# Patient Record
Sex: FEMALE | Race: WHITE | Employment: FULL TIME | URBAN - NONMETROPOLITAN AREA
[De-identification: names, ages, dates, MRNs, and addresses within clinical notes are randomized per-mention and may not be internally consistent; named-entity substitution may affect disease eponyms.]

---

## 2019-04-08 ENCOUNTER — HOSPITAL ENCOUNTER (OUTPATIENT)
Age: 34
Setting detail: SPECIMEN
Discharge: HOME | End: 2019-04-08
Payer: COMMERCIAL

## 2019-04-08 ENCOUNTER — OFFICE VISIT (OUTPATIENT)
Dept: OBGYN | Age: 34
End: 2019-04-08
Payer: COMMERCIAL

## 2019-04-08 VITALS
BODY MASS INDEX: 21.38 KG/M2 | SYSTOLIC BLOOD PRESSURE: 100 MMHG | DIASTOLIC BLOOD PRESSURE: 62 MMHG | HEIGHT: 66 IN | WEIGHT: 133 LBS

## 2019-04-08 DIAGNOSIS — Z01.419 WOMEN'S ANNUAL ROUTINE GYNECOLOGICAL EXAMINATION: ICD-10-CM

## 2019-04-08 DIAGNOSIS — Z01.419 WOMEN'S ANNUAL ROUTINE GYNECOLOGICAL EXAMINATION: Primary | ICD-10-CM

## 2019-04-08 PROCEDURE — 99385 PREV VISIT NEW AGE 18-39: CPT | Performed by: OBSTETRICS & GYNECOLOGY

## 2019-04-08 PROCEDURE — G0145 SCR C/V CYTO,THINLAYER,RESCR: HCPCS

## 2019-04-08 RX ORDER — IBUPROFEN 200 MG
200 TABLET ORAL EVERY 6 HOURS PRN
COMMUNITY
End: 2019-05-22

## 2019-04-08 SDOH — HEALTH STABILITY: MENTAL HEALTH: HOW OFTEN DO YOU HAVE A DRINK CONTAINING ALCOHOL?: MONTHLY OR LESS

## 2019-04-08 NOTE — PROGRESS NOTES
YEARLY PHYSICAL    Date of service: 2019    Jn Mullins  Is a 29 y.o.   female    PT's PCP is: No primary care provider on file. : 1985                                             Subjective:       No LMP recorded. Are your menses regular: not applicable    OB History    Para Term  AB Living   3 3 3     3   SAB TAB Ectopic Molar Multiple Live Births             3      # Outcome Date GA Lbr Niall/2nd Weight Sex Delivery Anes PTL Lv   3 Term 13 40w0d   F Vag-Spont  N ANMOL   2 Term 12 40w0d   F Vag-Spont  N ANMOL   1 Term 06 40w0d   M Vag-Spont  N ANMOL        Social History     Tobacco Use   Smoking Status Never Smoker   Smokeless Tobacco Never Used        Social History     Substance and Sexual Activity   Alcohol Use Yes    Frequency: Monthly or less    Binge frequency: Never    Comment: socially mostly holidays       Family History   Problem Relation Age of Onset    Colon Cancer Maternal Grandmother     Hypertension Maternal Grandmother     Diabetes Maternal Grandmother     Lung Cancer Maternal Grandfather     Heart Disease Maternal Grandfather     Esophageal Cancer Maternal Grandfather     Cervical Cancer Sister        Allergies: Patient has no known allergies.       Current Outpatient Medications:     ibuprofen (ADVIL;MOTRIN) 200 MG tablet, Take 200 mg by mouth every 6 hours as needed for Pain, Disp: , Rfl:     Social History     Substance and Sexual Activity   Sexual Activity Yes    Partners: Male    Birth control/protection: IUD       Any bleeding or pain with intercourse: No    Last Yearly:  -    Last pap: -    Last HPV: - (+) HRHPV    Last Mammogram: never    Last Donivan Stai never    Do you do self breast exams: Yes    Past Medical History:   Diagnosis Date    Cervical cancer ([facility] [address]     Heart murmur        Past Surgical History:   Procedure Laterality Date    BUNIONECTOMY      x3    LEEP  12/2017       Family History   Problem Relation Age of Onset    Colon Cancer Maternal Grandmother     Hypertension Maternal Grandmother     Diabetes Maternal Grandmother     Lung Cancer Maternal Grandfather     Heart Disease Maternal Grandfather     Esophageal Cancer Maternal Grandfather     Cervical Cancer Sister        Any family history of breast or ovarian cancer: No    Any family history of blood clots: Yes, MGM      Chief Complaint   Patient presents with    Gynecologic Exam          Nurse: JUAN PABLO    PE:  Vital Signs  Blood pressure 100/62, height 5' 6\" (1.676 m), weight 133 lb (60.3 kg). Labs:    No results found for this visit on 04/08/19. HPI: Patient is here today for a new GYN visit. Per her history she had so she has cervical cancer. However from everything she told me I believe she had cervical dysplasia possibly carcinoma in situ she was just treated with a LEEP procedure. The patient also wishes to have her IUD removed    NoPT denies fever, chills, nausea and vomiting       Objective  Lymphatic:   no lymphadenopathy  Heent:   negative   Cor: regular rate and rhythm, no murmurs              Pul:clear to auscultation bilaterally- no wheezes, rales or rhonchi, normal air movement, no respiratory distress      GI: Abdomen soft, non-tender.  BS normal. No masses,  No organomegaly           Extremities: normal strength, tone, and muscle mass   Breasts: Breast:normal appearance, no masses or tenderness, Inspection negative, No nipple retraction or dimpling, No nipple discharge or bleeding, No axillary or supraclavicular adenopathy, Normal to palpation without dominant masses   Pelvic Exam: GENITAL/URINARY:  External Genitalia:  General appearance; normal, Hair distribution; normal, Lesions absent  Vagina:  General appearance normal, Estrogen effect normal, Discharge absent, Lesions absent, Pelvic support normal  Cervix:  General appearance normal, Lesions

## 2019-04-18 LAB — CYTOLOGY REPORT: NORMAL

## 2019-05-22 ENCOUNTER — HOSPITAL ENCOUNTER (OUTPATIENT)
Age: 34
Setting detail: SPECIMEN
Discharge: HOME | End: 2019-05-22
Payer: COMMERCIAL

## 2019-05-22 ENCOUNTER — INITIAL PRENATAL (OUTPATIENT)
Dept: OBGYN CLINIC | Age: 34
End: 2019-05-22
Payer: COMMERCIAL

## 2019-05-22 VITALS
SYSTOLIC BLOOD PRESSURE: 105 MMHG | BODY MASS INDEX: 21.89 KG/M2 | DIASTOLIC BLOOD PRESSURE: 66 MMHG | HEART RATE: 57 BPM | WEIGHT: 135.6 LBS

## 2019-05-22 DIAGNOSIS — N91.2 AMENORRHEA: Primary | ICD-10-CM

## 2019-05-22 DIAGNOSIS — Z32.01 POSITIVE URINE PREGNANCY TEST: ICD-10-CM

## 2019-05-22 DIAGNOSIS — Z34.91 ENCOUNTER FOR SUPERVISION OF NORMAL PREGNANCY IN FIRST TRIMESTER, UNSPECIFIED GRAVIDITY: ICD-10-CM

## 2019-05-22 DIAGNOSIS — N91.2 AMENORRHEA: ICD-10-CM

## 2019-05-22 LAB
ABO/RH: NORMAL
AMPHETAMINE SCREEN URINE: NEGATIVE
ANTIBODY SCREEN: NEGATIVE
BARBITURATE SCREEN URINE: NEGATIVE
BENZODIAZEPINE SCREEN, URINE: NEGATIVE
BUPRENORPHINE URINE: NORMAL
CANNABINOID SCREEN URINE: NEGATIVE
COCAINE METABOLITE, URINE: NEGATIVE
CONTROL: PRESENT
HCG QUANTITATIVE: ABNORMAL IU/L
MDMA URINE: NORMAL
METHADONE SCREEN, URINE: NEGATIVE
METHAMPHETAMINE, URINE: NEGATIVE
OPIATES, URINE: NEGATIVE
OXYCODONE SCREEN URINE: NEGATIVE
PHENCYCLIDINE, URINE: NEGATIVE
PREGNANCY TEST URINE, POC: POSITIVE
PROPOXYPHENE, URINE: NEGATIVE
TEST INFORMATION: NORMAL
TRICYCLIC ANTIDEPRESSANTS, UR: NEGATIVE

## 2019-05-22 PROCEDURE — 86803 HEPATITIS C AB TEST: CPT

## 2019-05-22 PROCEDURE — 84702 CHORIONIC GONADOTROPIN TEST: CPT

## 2019-05-22 PROCEDURE — 80306 DRUG TEST PRSMV INSTRMNT: CPT

## 2019-05-22 PROCEDURE — 87591 N.GONORRHOEAE DNA AMP PROB: CPT

## 2019-05-22 PROCEDURE — 0500F INITIAL PRENATAL CARE VISIT: CPT | Performed by: OBSTETRICS & GYNECOLOGY

## 2019-05-22 PROCEDURE — 87340 HEPATITIS B SURFACE AG IA: CPT

## 2019-05-22 PROCEDURE — 86780 TREPONEMA PALLIDUM: CPT

## 2019-05-22 PROCEDURE — 86762 RUBELLA ANTIBODY: CPT

## 2019-05-22 PROCEDURE — 87389 HIV-1 AG W/HIV-1&-2 AB AG IA: CPT

## 2019-05-22 PROCEDURE — 87086 URINE CULTURE/COLONY COUNT: CPT

## 2019-05-22 PROCEDURE — 36415 COLL VENOUS BLD VENIPUNCTURE: CPT | Performed by: OBSTETRICS & GYNECOLOGY

## 2019-05-22 PROCEDURE — 86901 BLOOD TYPING SEROLOGIC RH(D): CPT

## 2019-05-22 PROCEDURE — 86850 RBC ANTIBODY SCREEN: CPT

## 2019-05-22 PROCEDURE — 87491 CHLMYD TRACH DNA AMP PROBE: CPT

## 2019-05-22 PROCEDURE — 86900 BLOOD TYPING SEROLOGIC ABO: CPT

## 2019-05-22 PROCEDURE — 85025 COMPLETE CBC W/AUTO DIFF WBC: CPT

## 2019-05-22 NOTE — PROGRESS NOTES
problem was TSH drawn: yes   Already drawn in media. My chart set up and activated: Yes     Assessment:      Diagnosis Orders   1. Amenorrhea  C.trachomatis N.gonorrhoeae DNA, Urine    Hepatitis C Antibody    HIV Screen    POCT urine pregnancy    PRENATAL PROFILE I    Prenatal type and screen    Urine Culture    Urine Drug Screen, Comprehensive    HCG, Quantitative, Pregnancy   2. Positive urine pregnancy test  C.trachomatis N.gonorrhoeae DNA, Urine    Hepatitis C Antibody    HIV Screen    POCT urine pregnancy    PRENATAL PROFILE I    Prenatal type and screen    Urine Culture    Urine Drug Screen, Comprehensive    HCG, Quantitative, Pregnancy   3.  Encounter for supervision of normal pregnancy in first trimester, unspecified   C.trachomatis N.gonorrhoeae DNA, Urine    Hepatitis C Antibody    HIV Screen    POCT urine pregnancy    PRENATAL PROFILE I    Prenatal type and screen    Urine Culture    Urine Drug Screen, Comprehensive    HCG, Quantitative, Pregnancy       Plan: Order Routine Prenatal Lab Yes     Order additional testing if requested         Order Prenatal Vitamins   No: pt does not take              Appointment with Dr. Mu Goodman in 2 weeks for Dating U/S and total body exam if indicated      Nurse:   Freida Ferguson

## 2019-05-23 LAB
ABSOLUTE EOS #: 0.1 K/UL (ref 0–0.4)
ABSOLUTE IMMATURE GRANULOCYTE: NORMAL K/UL (ref 0–0.3)
ABSOLUTE LYMPH #: 1.7 K/UL (ref 1–4.8)
ABSOLUTE MONO #: 0.4 K/UL (ref 0–1)
BASOPHILS # BLD: 0 % (ref 0–2)
BASOPHILS ABSOLUTE: 0 K/UL (ref 0–0.2)
CULTURE: NORMAL
DIFFERENTIAL TYPE: YES
EOSINOPHILS RELATIVE PERCENT: 2 % (ref 0–5)
HCT VFR BLD CALC: 39.2 % (ref 36–46)
HEMOGLOBIN: 13.3 G/DL (ref 12–16)
HEPATITIS B SURFACE ANTIGEN: NONREACTIVE
HEPATITIS C ANTIBODY: NONREACTIVE
HIV AG/AB: NONREACTIVE
IMMATURE GRANULOCYTES: NORMAL %
LYMPHOCYTES # BLD: 28 % (ref 15–40)
Lab: NORMAL
MCH RBC QN AUTO: 32.4 PG (ref 26–34)
MCHC RBC AUTO-ENTMCNC: 34 G/DL (ref 31–37)
MCV RBC AUTO: 95.3 FL (ref 80–100)
MONOCYTES # BLD: 6 % (ref 4–8)
NRBC AUTOMATED: NORMAL PER 100 WBC
PDW BLD-RTO: 12.3 % (ref 12.1–15.2)
PLATELET # BLD: 189 K/UL (ref 140–450)
PLATELET ESTIMATE: NORMAL
PMV BLD AUTO: NORMAL FL (ref 6–12)
RBC # BLD: 4.11 M/UL (ref 4–5.2)
RBC # BLD: NORMAL 10*6/UL
RUBV IGG SER QL: >500 IU/ML
SEG NEUTROPHILS: 64 % (ref 47–75)
SEGMENTED NEUTROPHILS ABSOLUTE COUNT: 4 K/UL (ref 2.5–7)
SPECIMEN DESCRIPTION: NORMAL
T. PALLIDUM, IGG: NONREACTIVE
WBC # BLD: 6.3 K/UL (ref 3.5–11)
WBC # BLD: NORMAL 10*3/UL

## 2019-05-24 LAB
C. TRACHOMATIS DNA ,URINE: NEGATIVE
N. GONORRHOEAE DNA, URINE: NEGATIVE
SPECIMEN DESCRIPTION: NORMAL

## 2019-06-03 DIAGNOSIS — O21.9 NAUSEA/VOMITING IN PREGNANCY: Primary | ICD-10-CM

## 2019-06-03 RX ORDER — ONDANSETRON 4 MG/1
4 TABLET, FILM COATED ORAL EVERY 8 HOURS PRN
Qty: 30 TABLET | Refills: 3 | Status: SHIPPED | OUTPATIENT
Start: 2019-06-03 | End: 2019-06-12

## 2019-06-05 ENCOUNTER — ROUTINE PRENATAL (OUTPATIENT)
Dept: OBGYN | Age: 34
End: 2019-06-05
Payer: COMMERCIAL

## 2019-06-05 DIAGNOSIS — Z3A.01 7 WEEKS GESTATION OF PREGNANCY: ICD-10-CM

## 2019-06-05 DIAGNOSIS — Z36.87 UNSURE OF LMP (LAST MENSTRUAL PERIOD) AS REASON FOR ULTRASOUND SCAN: Primary | ICD-10-CM

## 2019-06-05 LAB
CRL: 1 CM
SAC DIAMETER: NORMAL CM

## 2019-06-05 PROCEDURE — 76801 OB US < 14 WKS SINGLE FETUS: CPT | Performed by: OBSTETRICS & GYNECOLOGY

## 2019-06-11 ENCOUNTER — TELEPHONE (OUTPATIENT)
Dept: OBGYN | Age: 34
End: 2019-06-11

## 2019-06-12 DIAGNOSIS — O21.9 NAUSEA/VOMITING IN PREGNANCY: ICD-10-CM

## 2019-06-12 DIAGNOSIS — O21.9 NAUSEA/VOMITING IN PREGNANCY: Primary | ICD-10-CM

## 2019-06-12 RX ORDER — PROMETHAZINE HYDROCHLORIDE 25 MG/1
25 TABLET ORAL 4 TIMES DAILY PRN
Qty: 50 TABLET | Refills: 3 | Status: SHIPPED | OUTPATIENT
Start: 2019-06-12 | End: 2019-07-01 | Stop reason: ALTCHOICE

## 2019-06-12 RX ORDER — PROMETHAZINE HYDROCHLORIDE 25 MG/1
25 TABLET ORAL 4 TIMES DAILY PRN
Qty: 50 TABLET | Refills: 3 | Status: SHIPPED | OUTPATIENT
Start: 2019-06-12 | End: 2019-06-12 | Stop reason: SDUPTHER

## 2019-07-01 ENCOUNTER — ROUTINE PRENATAL (OUTPATIENT)
Dept: OBGYN | Age: 34
End: 2019-07-01

## 2019-07-01 VITALS — WEIGHT: 130 LBS | DIASTOLIC BLOOD PRESSURE: 60 MMHG | BODY MASS INDEX: 20.98 KG/M2 | SYSTOLIC BLOOD PRESSURE: 100 MMHG

## 2019-07-01 DIAGNOSIS — N30.00 ACUTE CYSTITIS WITHOUT HEMATURIA: Primary | ICD-10-CM

## 2019-07-01 DIAGNOSIS — O99.611 CONSTIPATION DURING PREGNANCY IN FIRST TRIMESTER: ICD-10-CM

## 2019-07-01 DIAGNOSIS — O21.9 NAUSEA/VOMITING IN PREGNANCY: ICD-10-CM

## 2019-07-01 DIAGNOSIS — K59.00 CONSTIPATION DURING PREGNANCY IN FIRST TRIMESTER: ICD-10-CM

## 2019-07-01 PROCEDURE — 0500F INITIAL PRENATAL CARE VISIT: CPT | Performed by: OBSTETRICS & GYNECOLOGY

## 2019-07-01 RX ORDER — PROMETHAZINE HYDROCHLORIDE 25 MG/1
25 TABLET ORAL 4 TIMES DAILY PRN
Qty: 30 TABLET | Refills: 3 | Status: SHIPPED | OUTPATIENT
Start: 2019-07-01 | End: 2019-09-04 | Stop reason: CLARIF

## 2019-07-01 RX ORDER — SENNA PLUS 8.6 MG/1
1 TABLET ORAL 2 TIMES DAILY
Qty: 60 TABLET | Refills: 11 | Status: SHIPPED | OUTPATIENT
Start: 2019-07-01 | End: 2019-09-04 | Stop reason: ALTCHOICE

## 2019-07-01 RX ORDER — CEPHALEXIN 250 MG/1
250 CAPSULE ORAL 3 TIMES DAILY
Qty: 21 CAPSULE | Refills: 0 | Status: SHIPPED | OUTPATIENT
Start: 2019-07-01 | End: 2019-09-04 | Stop reason: ALTCHOICE

## 2019-07-01 RX ORDER — ONDANSETRON 4 MG/1
TABLET, FILM COATED ORAL
Status: ON HOLD | COMMUNITY
Start: 2019-06-03 | End: 2020-01-08 | Stop reason: HOSPADM

## 2019-07-04 ENCOUNTER — HOSPITAL ENCOUNTER (EMERGENCY)
Age: 34
Discharge: HOME | End: 2019-07-04
Attending: EMERGENCY MEDICINE
Payer: COMMERCIAL

## 2019-07-04 ENCOUNTER — APPOINTMENT (OUTPATIENT)
Dept: ULTRASOUND IMAGING | Age: 34
End: 2019-07-04
Payer: COMMERCIAL

## 2019-07-04 VITALS
OXYGEN SATURATION: 97 % | DIASTOLIC BLOOD PRESSURE: 67 MMHG | TEMPERATURE: 97.7 F | SYSTOLIC BLOOD PRESSURE: 109 MMHG | HEART RATE: 66 BPM | RESPIRATION RATE: 14 BRPM

## 2019-07-04 DIAGNOSIS — O26.891 PELVIC PAIN AFFECTING PREGNANCY IN FIRST TRIMESTER, ANTEPARTUM: Primary | ICD-10-CM

## 2019-07-04 DIAGNOSIS — R10.2 PELVIC PAIN AFFECTING PREGNANCY IN FIRST TRIMESTER, ANTEPARTUM: Primary | ICD-10-CM

## 2019-07-04 LAB
-: NORMAL
ABSOLUTE EOS #: 0.06 K/UL (ref 0–0.44)
ABSOLUTE IMMATURE GRANULOCYTE: 0.03 K/UL (ref 0–0.3)
ABSOLUTE LYMPH #: 1.96 K/UL (ref 1.1–3.7)
ABSOLUTE MONO #: 0.37 K/UL (ref 0.1–1.2)
AMORPHOUS: NORMAL
ANION GAP SERPL CALCULATED.3IONS-SCNC: 10 MMOL/L (ref 9–17)
BACTERIA: NORMAL
BASOPHILS # BLD: 0 % (ref 0–2)
BASOPHILS ABSOLUTE: 0.03 K/UL (ref 0–0.2)
BILIRUBIN URINE: NEGATIVE
BUN BLDV-MCNC: 11 MG/DL (ref 6–20)
BUN/CREAT BLD: 20 (ref 9–20)
CALCIUM SERPL-MCNC: 8.9 MG/DL (ref 8.6–10.4)
CASTS UA: NORMAL /LPF
CHLORIDE BLD-SCNC: 101 MMOL/L (ref 98–107)
CO2: 25 MMOL/L (ref 20–31)
COLOR: YELLOW
COMMENT UA: NORMAL
CREAT SERPL-MCNC: 0.54 MG/DL (ref 0.5–0.9)
CRYSTALS, UA: NORMAL /HPF
DIFFERENTIAL TYPE: ABNORMAL
EOSINOPHILS RELATIVE PERCENT: 1 % (ref 1–4)
EPITHELIAL CELLS UA: NORMAL /HPF (ref 0–25)
GFR AFRICAN AMERICAN: >60 ML/MIN
GFR NON-AFRICAN AMERICAN: >60 ML/MIN
GFR SERPL CREATININE-BSD FRML MDRD: ABNORMAL ML/MIN/{1.73_M2}
GFR SERPL CREATININE-BSD FRML MDRD: ABNORMAL ML/MIN/{1.73_M2}
GLUCOSE BLD-MCNC: 97 MG/DL (ref 70–99)
GLUCOSE URINE: NEGATIVE
HCG QUANTITATIVE: ABNORMAL IU/L
HCT VFR BLD CALC: 35.7 % (ref 36.3–47.1)
HEMOGLOBIN: 11.9 G/DL (ref 11.9–15.1)
IMMATURE GRANULOCYTES: 0 %
KETONES, URINE: NEGATIVE
LEUKOCYTE ESTERASE, URINE: NEGATIVE
LYMPHOCYTES # BLD: 24 % (ref 24–43)
MCH RBC QN AUTO: 32 PG (ref 25.2–33.5)
MCHC RBC AUTO-ENTMCNC: 33.3 G/DL (ref 28.4–34.8)
MCV RBC AUTO: 96 FL (ref 82.6–102.9)
MONOCYTES # BLD: 5 % (ref 3–12)
MUCUS: NORMAL
NITRITE, URINE: NEGATIVE
NRBC AUTOMATED: 0 PER 100 WBC
OTHER OBSERVATIONS UA: NORMAL
PDW BLD-RTO: 11.4 % (ref 11.8–14.4)
PH UA: 7 (ref 5–9)
PLATELET # BLD: 172 K/UL (ref 138–453)
PLATELET ESTIMATE: ABNORMAL
PMV BLD AUTO: 11 FL (ref 8.1–13.5)
POTASSIUM SERPL-SCNC: 3.6 MMOL/L (ref 3.7–5.3)
PROTEIN UA: NEGATIVE
RBC # BLD: 3.72 M/UL (ref 3.95–5.11)
RBC # BLD: ABNORMAL 10*6/UL
RBC UA: NORMAL /HPF (ref 0–2)
RENAL EPITHELIAL, UA: NORMAL /HPF
SEG NEUTROPHILS: 70 % (ref 36–65)
SEGMENTED NEUTROPHILS ABSOLUTE COUNT: 5.59 K/UL (ref 1.5–8.1)
SODIUM BLD-SCNC: 136 MMOL/L (ref 135–144)
SPECIFIC GRAVITY UA: 1.01 (ref 1.01–1.02)
TRICHOMONAS: NORMAL
TURBIDITY: CLEAR
URINE HGB: NEGATIVE
UROBILINOGEN, URINE: NORMAL
WBC # BLD: 8 K/UL (ref 3.5–11.3)
WBC # BLD: ABNORMAL 10*3/UL
WBC UA: NORMAL /HPF (ref 0–5)
YEAST: NORMAL

## 2019-07-04 PROCEDURE — 2580000003 HC RX 258: Performed by: EMERGENCY MEDICINE

## 2019-07-04 PROCEDURE — 99284 EMERGENCY DEPT VISIT MOD MDM: CPT

## 2019-07-04 PROCEDURE — 81001 URINALYSIS AUTO W/SCOPE: CPT

## 2019-07-04 PROCEDURE — 96375 TX/PRO/DX INJ NEW DRUG ADDON: CPT

## 2019-07-04 PROCEDURE — 87086 URINE CULTURE/COLONY COUNT: CPT

## 2019-07-04 PROCEDURE — 6360000002 HC RX W HCPCS: Performed by: EMERGENCY MEDICINE

## 2019-07-04 PROCEDURE — 80048 BASIC METABOLIC PNL TOTAL CA: CPT

## 2019-07-04 PROCEDURE — 76817 TRANSVAGINAL US OBSTETRIC: CPT

## 2019-07-04 PROCEDURE — 84702 CHORIONIC GONADOTROPIN TEST: CPT

## 2019-07-04 PROCEDURE — 85025 COMPLETE CBC W/AUTO DIFF WBC: CPT

## 2019-07-04 PROCEDURE — 96374 THER/PROPH/DIAG INJ IV PUSH: CPT

## 2019-07-04 RX ORDER — FENTANYL CITRATE 50 UG/ML
50 INJECTION, SOLUTION INTRAMUSCULAR; INTRAVENOUS ONCE
Status: COMPLETED | OUTPATIENT
Start: 2019-07-04 | End: 2019-07-04

## 2019-07-04 RX ORDER — HYDROCODONE BITARTRATE AND ACETAMINOPHEN 5; 325 MG/1; MG/1
1 TABLET ORAL EVERY 6 HOURS PRN
Qty: 10 TABLET | Refills: 0 | Status: SHIPPED | OUTPATIENT
Start: 2019-07-04 | End: 2019-07-07

## 2019-07-04 RX ORDER — PROMETHAZINE HYDROCHLORIDE 25 MG/ML
12.5 INJECTION, SOLUTION INTRAMUSCULAR; INTRAVENOUS ONCE
Status: COMPLETED | OUTPATIENT
Start: 2019-07-04 | End: 2019-07-04

## 2019-07-04 RX ADMIN — FENTANYL CITRATE 50 MCG: 50 INJECTION INTRAMUSCULAR; INTRAVENOUS at 21:02

## 2019-07-04 RX ADMIN — SODIUM CHLORIDE 999 ML: 9 INJECTION, SOLUTION INTRAVENOUS at 20:57

## 2019-07-04 RX ADMIN — PROMETHAZINE HYDROCHLORIDE 12.5 MG: 25 INJECTION INTRAMUSCULAR; INTRAVENOUS at 20:59

## 2019-07-04 ASSESSMENT — ENCOUNTER SYMPTOMS
DIARRHEA: 0
CHOKING: 0
VOMITING: 0
NAUSEA: 1
ABDOMINAL PAIN: 1
EYE DISCHARGE: 0
ABDOMINAL DISTENTION: 0
TROUBLE SWALLOWING: 0
WHEEZING: 0
COUGH: 0
SHORTNESS OF BREATH: 0
COLOR CHANGE: 0
BACK PAIN: 0
SORE THROAT: 0
CONSTIPATION: 1

## 2019-07-04 ASSESSMENT — PAIN DESCRIPTION - LOCATION: LOCATION: ABDOMEN

## 2019-07-04 ASSESSMENT — PAIN SCALES - GENERAL
PAINLEVEL_OUTOF10: 10
PAINLEVEL_OUTOF10: 10

## 2019-07-04 ASSESSMENT — PAIN DESCRIPTION - PAIN TYPE: TYPE: ACUTE PAIN

## 2019-07-05 NOTE — ED NOTES
Patient sleeping. Patient awakens easily to name. Patient updated on turn around times for ultra sound. Warm blanket provided. Patient denies needs/questions at this time.       Derick Polanco RN  07/04/19 8216

## 2019-07-06 LAB
CULTURE: NORMAL
Lab: NORMAL
SPECIMEN DESCRIPTION: NORMAL

## 2019-07-16 ENCOUNTER — TELEPHONE (OUTPATIENT)
Dept: OBGYN | Age: 34
End: 2019-07-16

## 2019-07-16 NOTE — TELEPHONE ENCOUNTER
Patient phoned stating she had some very light pink on the toilet paper using the restroom. Patient denies any severe cramping or discomfort of any sort. States she has had some pelvic pain on and off since July 4 and she was seen in the ER and US showed baby was good with HR in the 150s. I advised patient that this was nothing worrisome at this point and to take it easy with no heavy lifting, if the bleeding or cramping progresses go to the ER. She voiced understanding and was reassured.

## 2019-07-23 ENCOUNTER — TELEPHONE (OUTPATIENT)
Dept: OBGYN CLINIC | Age: 34
End: 2019-07-23

## 2019-07-23 DIAGNOSIS — O26.892 PREGNANCY HEADACHE IN SECOND TRIMESTER: Primary | ICD-10-CM

## 2019-07-23 DIAGNOSIS — R51.9 PREGNANCY HEADACHE IN SECOND TRIMESTER: Primary | ICD-10-CM

## 2019-07-23 RX ORDER — BUTALBITAL, ACETAMINOPHEN AND CAFFEINE 50; 325; 40 MG/1; MG/1; MG/1
1 TABLET ORAL EVERY 4 HOURS PRN
Qty: 50 TABLET | Refills: 3 | Status: ON HOLD | OUTPATIENT
Start: 2019-07-23 | End: 2020-01-08 | Stop reason: HOSPADM

## 2019-07-29 ENCOUNTER — ROUTINE PRENATAL (OUTPATIENT)
Dept: OBGYN | Age: 34
End: 2019-07-29

## 2019-07-29 VITALS — BODY MASS INDEX: 21.47 KG/M2 | DIASTOLIC BLOOD PRESSURE: 62 MMHG | SYSTOLIC BLOOD PRESSURE: 110 MMHG | WEIGHT: 133 LBS

## 2019-07-29 DIAGNOSIS — Z3A.15 15 WEEKS GESTATION OF PREGNANCY: Primary | ICD-10-CM

## 2019-07-29 DIAGNOSIS — O21.9 NAUSEA AND VOMITING DURING PREGNANCY PRIOR TO 22 WEEKS GESTATION: ICD-10-CM

## 2019-07-29 PROCEDURE — 0502F SUBSEQUENT PRENATAL CARE: CPT | Performed by: OBSTETRICS & GYNECOLOGY

## 2019-07-29 RX ORDER — PROCHLORPERAZINE MALEATE 10 MG
10 TABLET ORAL EVERY 6 HOURS PRN
Qty: 50 TABLET | Refills: 5 | Status: ON HOLD | OUTPATIENT
Start: 2019-07-29 | End: 2020-01-08 | Stop reason: HOSPADM

## 2019-08-05 DIAGNOSIS — Z3A.15 15 WEEKS GESTATION OF PREGNANCY: ICD-10-CM

## 2019-09-04 ENCOUNTER — ROUTINE PRENATAL (OUTPATIENT)
Dept: OBGYN | Age: 34
End: 2019-09-04
Payer: COMMERCIAL

## 2019-09-04 VITALS — SYSTOLIC BLOOD PRESSURE: 112 MMHG | WEIGHT: 140 LBS | DIASTOLIC BLOOD PRESSURE: 70 MMHG | BODY MASS INDEX: 22.6 KG/M2

## 2019-09-04 DIAGNOSIS — Z36.3 ANTENATAL SCREENING FOR MALFORMATION USING ULTRASONICS: Primary | ICD-10-CM

## 2019-09-04 DIAGNOSIS — Z3A.20 20 WEEKS GESTATION OF PREGNANCY: ICD-10-CM

## 2019-09-04 LAB
ABDOMINAL CIRCUMFERENCE: 16.5 CM
BIPARIETAL DIAMETER: 4.85 CM
ESTIMATED FETAL WEIGHT: 405 GRAMS
FEMORAL DIAMETER: NORMAL CM
HC/AC: 1.12
HEAD CIRCUMFERENCE: 18 CM

## 2019-09-04 PROCEDURE — 76805 OB US >/= 14 WKS SNGL FETUS: CPT | Performed by: OBSTETRICS & GYNECOLOGY

## 2019-09-04 PROCEDURE — 0502F SUBSEQUENT PRENATAL CARE: CPT | Performed by: OBSTETRICS & GYNECOLOGY

## 2019-09-11 ENCOUNTER — HOSPITAL ENCOUNTER (OUTPATIENT)
Age: 34
Discharge: HOME | End: 2019-09-11
Attending: ADVANCED PRACTICE MIDWIFE | Admitting: ADVANCED PRACTICE MIDWIFE
Payer: COMMERCIAL

## 2019-09-11 VITALS
RESPIRATION RATE: 16 BRPM | SYSTOLIC BLOOD PRESSURE: 108 MMHG | DIASTOLIC BLOOD PRESSURE: 65 MMHG | TEMPERATURE: 98 F | HEART RATE: 62 BPM

## 2019-09-11 LAB
ABSOLUTE EOS #: 0.05 K/UL (ref 0–0.44)
ABSOLUTE IMMATURE GRANULOCYTE: 0.04 K/UL (ref 0–0.3)
ABSOLUTE LYMPH #: 1.64 K/UL (ref 1.1–3.7)
ABSOLUTE MONO #: 0.42 K/UL (ref 0.1–1.2)
ALBUMIN SERPL-MCNC: 3.4 G/DL (ref 3.5–5.2)
ALBUMIN/GLOBULIN RATIO: 1.3 (ref 1–2.5)
ALP BLD-CCNC: 68 U/L (ref 35–104)
ALT SERPL-CCNC: 13 U/L (ref 5–33)
ANION GAP SERPL CALCULATED.3IONS-SCNC: 10 MMOL/L (ref 9–17)
AST SERPL-CCNC: 19 U/L
BASOPHILS # BLD: 0 % (ref 0–2)
BASOPHILS ABSOLUTE: <0.03 K/UL (ref 0–0.2)
BILIRUB SERPL-MCNC: <0.1 MG/DL (ref 0.3–1.2)
BILIRUBIN URINE: NEGATIVE
BUN BLDV-MCNC: 6 MG/DL (ref 6–20)
BUN/CREAT BLD: 13 (ref 9–20)
CALCIUM SERPL-MCNC: 8.3 MG/DL (ref 8.6–10.4)
CHLORIDE BLD-SCNC: 104 MMOL/L (ref 98–107)
CO2: 23 MMOL/L (ref 20–31)
COLOR: YELLOW
COMMENT UA: NORMAL
CREAT SERPL-MCNC: 0.46 MG/DL (ref 0.5–0.9)
DIFFERENTIAL TYPE: ABNORMAL
EOSINOPHILS RELATIVE PERCENT: 1 % (ref 1–4)
GFR AFRICAN AMERICAN: >60 ML/MIN
GFR NON-AFRICAN AMERICAN: >60 ML/MIN
GFR SERPL CREATININE-BSD FRML MDRD: ABNORMAL ML/MIN/{1.73_M2}
GFR SERPL CREATININE-BSD FRML MDRD: ABNORMAL ML/MIN/{1.73_M2}
GLUCOSE BLD-MCNC: 92 MG/DL (ref 70–99)
GLUCOSE URINE: NEGATIVE
HCT VFR BLD CALC: 33.6 % (ref 36.3–47.1)
HEMOGLOBIN: 11.2 G/DL (ref 11.9–15.1)
IMMATURE GRANULOCYTES: 1 %
KETONES, URINE: NEGATIVE
LACTATE DEHYDROGENASE: 228 U/L (ref 135–214)
LEUKOCYTE ESTERASE, URINE: NEGATIVE
LYMPHOCYTES # BLD: 24 % (ref 24–43)
MCH RBC QN AUTO: 33.1 PG (ref 25.2–33.5)
MCHC RBC AUTO-ENTMCNC: 33.3 G/DL (ref 28.4–34.8)
MCV RBC AUTO: 99.4 FL (ref 82.6–102.9)
MONOCYTES # BLD: 6 % (ref 3–12)
NITRITE, URINE: NEGATIVE
NRBC AUTOMATED: 0 PER 100 WBC
PDW BLD-RTO: 11.9 % (ref 11.8–14.4)
PH UA: 6.5 (ref 5–9)
PLATELET # BLD: ABNORMAL K/UL (ref 138–453)
PLATELET ESTIMATE: ABNORMAL
PLATELET, FLUORESCENCE: 131 K/UL (ref 138–453)
PLATELET, IMMATURE FRACTION: 6.2 % (ref 1.1–10.3)
PMV BLD AUTO: ABNORMAL FL (ref 8.1–13.5)
POTASSIUM SERPL-SCNC: 3.5 MMOL/L (ref 3.7–5.3)
PROTEIN UA: NEGATIVE
RBC # BLD: 3.38 M/UL (ref 3.95–5.11)
RBC # BLD: ABNORMAL 10*6/UL
SEG NEUTROPHILS: 68 % (ref 36–65)
SEGMENTED NEUTROPHILS ABSOLUTE COUNT: 4.63 K/UL (ref 1.5–8.1)
SODIUM BLD-SCNC: 137 MMOL/L (ref 135–144)
SPECIFIC GRAVITY UA: 1.02 (ref 1.01–1.02)
TOTAL PROTEIN: 6.1 G/DL (ref 6.4–8.3)
TURBIDITY: CLEAR
URIC ACID: 2.2 MG/DL (ref 2.4–5.7)
URINE HGB: NEGATIVE
UROBILINOGEN, URINE: NORMAL
WBC # BLD: 6.8 K/UL (ref 3.5–11.3)
WBC # BLD: ABNORMAL 10*3/UL

## 2019-09-11 PROCEDURE — 36415 COLL VENOUS BLD VENIPUNCTURE: CPT

## 2019-09-11 PROCEDURE — 85025 COMPLETE CBC W/AUTO DIFF WBC: CPT

## 2019-09-11 PROCEDURE — 80053 COMPREHEN METABOLIC PANEL: CPT

## 2019-09-11 PROCEDURE — 84550 ASSAY OF BLOOD/URIC ACID: CPT

## 2019-09-11 PROCEDURE — 99214 OFFICE O/P EST MOD 30 MIN: CPT

## 2019-09-11 PROCEDURE — 81003 URINALYSIS AUTO W/O SCOPE: CPT

## 2019-09-11 PROCEDURE — 83615 LACTATE (LD) (LDH) ENZYME: CPT

## 2019-09-11 PROCEDURE — 6370000000 HC RX 637 (ALT 250 FOR IP): Performed by: ADVANCED PRACTICE MIDWIFE

## 2019-09-11 RX ORDER — ACETAMINOPHEN 500 MG
1000 TABLET ORAL ONCE
Status: COMPLETED | OUTPATIENT
Start: 2019-09-11 | End: 2019-09-11

## 2019-09-11 RX ORDER — HYDROXYZINE PAMOATE 50 MG/1
50 CAPSULE ORAL ONCE
Status: COMPLETED | OUTPATIENT
Start: 2019-09-11 | End: 2019-09-11

## 2019-09-11 RX ADMIN — HYDROXYZINE PAMOATE 50 MG: 50 CAPSULE ORAL at 02:01

## 2019-09-11 RX ADMIN — ACETAMINOPHEN 1000 MG: 500 TABLET, FILM COATED ORAL at 02:01

## 2019-09-11 ASSESSMENT — PAIN SCALES - GENERAL: PAINLEVEL_OUTOF10: 2

## 2019-09-11 NOTE — PROGRESS NOTES
Discharge instructions given to pt. Pt denies questions/concerns. Wheelchair offered to pt to take to private vehicle, pt states \"yes please. \"

## 2019-09-11 NOTE — PROGRESS NOTES
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 I have been doing far more than I should be. \" \"I have been stressed because of work and I have 3 kids. \"

## 2019-09-30 ENCOUNTER — HOSPITAL ENCOUNTER (OUTPATIENT)
Dept: ULTRASOUND IMAGING | Age: 34
Discharge: HOME | End: 2019-10-02
Payer: COMMERCIAL

## 2019-09-30 DIAGNOSIS — E01.0 THYROMEGALY: ICD-10-CM

## 2019-09-30 PROCEDURE — 76536 US EXAM OF HEAD AND NECK: CPT

## 2019-10-03 ENCOUNTER — HOSPITAL ENCOUNTER (OUTPATIENT)
Age: 34
Setting detail: SPECIMEN
Discharge: HOME | End: 2019-10-03
Payer: COMMERCIAL

## 2019-10-03 ENCOUNTER — HOSPITAL ENCOUNTER (OUTPATIENT)
Dept: ULTRASOUND IMAGING | Age: 34
Discharge: HOME | End: 2019-10-05
Payer: COMMERCIAL

## 2019-10-03 ENCOUNTER — HOSPITAL ENCOUNTER (OUTPATIENT)
Age: 34
Discharge: HOME | End: 2019-10-03
Payer: COMMERCIAL

## 2019-10-03 DIAGNOSIS — Z33.1 IUP (INTRAUTERINE PREGNANCY), INCIDENTAL: ICD-10-CM

## 2019-10-03 LAB — TSH SERPL DL<=0.05 MIU/L-ACNC: 1.56 MIU/L (ref 0.3–5)

## 2019-10-03 PROCEDURE — 84443 ASSAY THYROID STIM HORMONE: CPT

## 2019-10-03 PROCEDURE — 36415 COLL VENOUS BLD VENIPUNCTURE: CPT

## 2019-10-03 PROCEDURE — 84439 ASSAY OF FREE THYROXINE: CPT

## 2019-10-03 PROCEDURE — 76811 OB US DETAILED SNGL FETUS: CPT

## 2019-10-03 PROCEDURE — 76817 TRANSVAGINAL US OBSTETRIC: CPT

## 2019-10-03 PROCEDURE — 84481 FREE ASSAY (FT-3): CPT

## 2019-10-04 LAB
T3 FREE: 2.83 PG/ML (ref 2.02–4.43)
THYROXINE, FREE: 0.96 NG/DL (ref 0.93–1.7)

## 2019-10-23 ENCOUNTER — HOSPITAL ENCOUNTER (OUTPATIENT)
Dept: ULTRASOUND IMAGING | Age: 34
Discharge: HOME | End: 2019-10-25
Payer: COMMERCIAL

## 2019-10-23 PROCEDURE — 76815 OB US LIMITED FETUS(S): CPT

## 2019-11-02 ENCOUNTER — HOSPITAL ENCOUNTER (OUTPATIENT)
Age: 34
Discharge: HOME | End: 2019-11-02
Attending: OBSTETRICS & GYNECOLOGY | Admitting: OBSTETRICS & GYNECOLOGY
Payer: COMMERCIAL

## 2019-11-02 VITALS
BODY MASS INDEX: 22.74 KG/M2 | WEIGHT: 141.5 LBS | RESPIRATION RATE: 20 BRPM | HEIGHT: 66 IN | SYSTOLIC BLOOD PRESSURE: 105 MMHG | HEART RATE: 80 BPM | DIASTOLIC BLOOD PRESSURE: 51 MMHG | TEMPERATURE: 97.9 F

## 2019-11-02 PROBLEM — R10.9 ABDOMINAL PAIN: Status: ACTIVE | Noted: 2019-11-02

## 2019-11-02 LAB
BILIRUBIN URINE: NEGATIVE
COLOR: YELLOW
COMMENT UA: NORMAL
GLUCOSE URINE: NEGATIVE
KETONES, URINE: NEGATIVE
LEUKOCYTE ESTERASE, URINE: NEGATIVE
NITRITE, URINE: NEGATIVE
PH UA: 7 (ref 5–9)
PROTEIN UA: NEGATIVE
SPECIFIC GRAVITY UA: 1.01 (ref 1.01–1.02)
TURBIDITY: CLEAR
URINE HGB: NEGATIVE
UROBILINOGEN, URINE: NORMAL

## 2019-11-02 PROCEDURE — 6370000000 HC RX 637 (ALT 250 FOR IP): Performed by: OBSTETRICS & GYNECOLOGY

## 2019-11-02 PROCEDURE — 59025 FETAL NON-STRESS TEST: CPT | Performed by: OBSTETRICS & GYNECOLOGY

## 2019-11-02 PROCEDURE — 81003 URINALYSIS AUTO W/O SCOPE: CPT

## 2019-11-02 RX ORDER — CIPROFLOXACIN 250 MG/1
250 TABLET, FILM COATED ORAL ONCE
Status: COMPLETED | OUTPATIENT
Start: 2019-11-02 | End: 2019-11-02

## 2019-11-02 RX ORDER — CALCIUM CARBONATE 200(500)MG
1 TABLET,CHEWABLE ORAL DAILY
Status: ON HOLD | COMMUNITY
End: 2020-01-08 | Stop reason: HOSPADM

## 2019-11-02 RX ORDER — ACETAMINOPHEN 325 MG/1
650 TABLET ORAL EVERY 4 HOURS PRN
Status: ON HOLD | COMMUNITY
End: 2020-01-08 | Stop reason: HOSPADM

## 2019-11-02 RX ADMIN — CIPROFLOXACIN 250 MG: 250 TABLET, FILM COATED ORAL at 20:24

## 2019-11-08 ENCOUNTER — HOSPITAL ENCOUNTER (EMERGENCY)
Age: 34
Discharge: HOME | End: 2019-11-08
Payer: COMMERCIAL

## 2019-11-08 VITALS
DIASTOLIC BLOOD PRESSURE: 85 MMHG | RESPIRATION RATE: 16 BRPM | TEMPERATURE: 97.4 F | OXYGEN SATURATION: 98 % | WEIGHT: 147 LBS | HEART RATE: 83 BPM | SYSTOLIC BLOOD PRESSURE: 126 MMHG | BODY MASS INDEX: 23.73 KG/M2

## 2019-11-08 DIAGNOSIS — K64.4 HEMORRHOIDS, EXTERNAL WITHOUT COMPLICATIONS: Primary | ICD-10-CM

## 2019-11-08 LAB
-: ABNORMAL
AMORPHOUS: ABNORMAL
BACTERIA: ABNORMAL
BILIRUBIN URINE: NEGATIVE
CASTS UA: ABNORMAL /LPF
COLOR: YELLOW
COMMENT UA: NORMAL
CRYSTALS, UA: ABNORMAL /HPF
EPITHELIAL CELLS UA: ABNORMAL /HPF (ref 0–25)
GLUCOSE URINE: NEGATIVE
KETONES, URINE: NEGATIVE
LEUKOCYTE ESTERASE, URINE: NEGATIVE
MUCUS: ABNORMAL
NITRITE, URINE: NEGATIVE
OTHER OBSERVATIONS UA: ABNORMAL
PH UA: 6.5 (ref 5–9)
PROTEIN UA: NEGATIVE
RBC UA: ABNORMAL /HPF (ref 0–2)
RENAL EPITHELIAL, UA: ABNORMAL /HPF
SPECIFIC GRAVITY UA: 1.01 (ref 1.01–1.02)
TRICHOMONAS: ABNORMAL
TURBIDITY: CLEAR
URINE HGB: NEGATIVE
UROBILINOGEN, URINE: NORMAL
WBC UA: ABNORMAL /HPF (ref 0–5)
YEAST: ABNORMAL

## 2019-11-08 PROCEDURE — 81001 URINALYSIS AUTO W/SCOPE: CPT

## 2019-11-08 PROCEDURE — 6370000000 HC RX 637 (ALT 250 FOR IP): Performed by: PHYSICIAN ASSISTANT

## 2019-11-08 PROCEDURE — 99284 EMERGENCY DEPT VISIT MOD MDM: CPT

## 2019-11-08 RX ORDER — PROMETHAZINE HYDROCHLORIDE 25 MG/1
25 TABLET ORAL EVERY 6 HOURS PRN
Status: ON HOLD | COMMUNITY
End: 2020-01-08 | Stop reason: HOSPADM

## 2019-11-08 RX ORDER — ACETAMINOPHEN 325 MG/1
650 TABLET ORAL ONCE
Status: COMPLETED | OUTPATIENT
Start: 2019-11-08 | End: 2019-11-08

## 2019-11-08 RX ADMIN — ACETAMINOPHEN 650 MG: 325 TABLET, FILM COATED ORAL at 20:34

## 2019-11-08 ASSESSMENT — PAIN DESCRIPTION - PAIN TYPE: TYPE: ACUTE PAIN

## 2019-11-08 ASSESSMENT — PAIN SCALES - GENERAL
PAINLEVEL_OUTOF10: 2
PAINLEVEL_OUTOF10: 2

## 2019-11-08 ASSESSMENT — PAIN DESCRIPTION - LOCATION: LOCATION: RECTUM

## 2019-11-08 ASSESSMENT — PAIN DESCRIPTION - FREQUENCY: FREQUENCY: CONTINUOUS

## 2019-11-30 ENCOUNTER — HOSPITAL ENCOUNTER (OUTPATIENT)
Age: 34
Discharge: HOME | End: 2019-12-01
Attending: ADVANCED PRACTICE MIDWIFE | Admitting: ADVANCED PRACTICE MIDWIFE
Payer: COMMERCIAL

## 2019-11-30 VITALS — HEIGHT: 66 IN | WEIGHT: 147 LBS | BODY MASS INDEX: 23.63 KG/M2 | TEMPERATURE: 97.4 F

## 2019-11-30 PROBLEM — O46.90 VAGINAL BLEEDING DURING PREGNANCY: Status: ACTIVE | Noted: 2019-11-30

## 2019-11-30 LAB
-: NORMAL
AMORPHOUS: NORMAL
BACTERIA: NORMAL
BILIRUBIN URINE: NEGATIVE
CASTS UA: NORMAL /LPF
COLOR: YELLOW
COMMENT UA: ABNORMAL
CRYSTALS, UA: NORMAL /HPF
EPITHELIAL CELLS UA: NORMAL /HPF (ref 0–25)
GLUCOSE URINE: NEGATIVE
KETONES, URINE: NEGATIVE
LEUKOCYTE ESTERASE, URINE: NEGATIVE
MUCUS: NORMAL
NITRITE, URINE: NEGATIVE
OTHER OBSERVATIONS UA: NORMAL
PH UA: 7 (ref 5–9)
PROTEIN UA: NEGATIVE
RBC UA: NORMAL /HPF (ref 0–2)
RENAL EPITHELIAL, UA: NORMAL /HPF
SPECIFIC GRAVITY UA: <1.005 (ref 1.01–1.02)
TRICHOMONAS: NORMAL
TURBIDITY: CLEAR
URINE HGB: ABNORMAL
UROBILINOGEN, URINE: NORMAL
WBC UA: NORMAL /HPF (ref 0–5)
YEAST: NORMAL

## 2019-11-30 PROCEDURE — 81001 URINALYSIS AUTO W/SCOPE: CPT

## 2019-12-01 PROCEDURE — 59025 FETAL NON-STRESS TEST: CPT | Performed by: OBSTETRICS & GYNECOLOGY

## 2020-01-05 ENCOUNTER — HOSPITAL ENCOUNTER (INPATIENT)
Age: 35
LOS: 3 days | Discharge: HOME | DRG: 540 | End: 2020-01-08
Attending: OBSTETRICS & GYNECOLOGY | Admitting: OBSTETRICS & GYNECOLOGY
Payer: COMMERCIAL

## 2020-01-05 PROBLEM — Z34.90 ENCOUNTER FOR INDUCTION OF LABOR: Status: ACTIVE | Noted: 2020-01-05

## 2020-01-05 LAB
ABSOLUTE EOS #: <0.03 K/UL (ref 0–0.44)
ABSOLUTE IMMATURE GRANULOCYTE: 0.03 K/UL (ref 0–0.3)
ABSOLUTE LYMPH #: 1.42 K/UL (ref 1.1–3.7)
ABSOLUTE MONO #: 0.41 K/UL (ref 0.1–1.2)
AMPHETAMINE SCREEN URINE: NEGATIVE
BARBITURATE SCREEN URINE: POSITIVE
BASOPHILS # BLD: 0 % (ref 0–2)
BASOPHILS ABSOLUTE: <0.03 K/UL (ref 0–0.2)
BENZODIAZEPINE SCREEN, URINE: NEGATIVE
BUPRENORPHINE URINE: NEGATIVE
CANNABINOID SCREEN URINE: NEGATIVE
COCAINE METABOLITE, URINE: NEGATIVE
DIFFERENTIAL TYPE: ABNORMAL
EOSINOPHILS RELATIVE PERCENT: 0 % (ref 1–4)
HCT VFR BLD CALC: 32.2 % (ref 36.3–47.1)
HEMOGLOBIN: 9.9 G/DL (ref 11.9–15.1)
IMMATURE GRANULOCYTES: 1 %
LYMPHOCYTES # BLD: 28 % (ref 24–43)
MCH RBC QN AUTO: 28.2 PG (ref 25.2–33.5)
MCHC RBC AUTO-ENTMCNC: 30.7 G/DL (ref 28.4–34.8)
MCV RBC AUTO: 91.7 FL (ref 82.6–102.9)
MDMA URINE: ABNORMAL
METHADONE SCREEN, URINE: NEGATIVE
METHAMPHETAMINE, URINE: NEGATIVE
MONOCYTES # BLD: 8 % (ref 3–12)
NRBC AUTOMATED: 0 PER 100 WBC
OPIATES, URINE: NEGATIVE
OXYCODONE SCREEN URINE: NEGATIVE
PDW BLD-RTO: 12.8 % (ref 11.8–14.4)
PHENCYCLIDINE, URINE: NEGATIVE
PLATELET # BLD: 154 K/UL (ref 138–453)
PLATELET ESTIMATE: ABNORMAL
PMV BLD AUTO: 11.9 FL (ref 8.1–13.5)
PROPOXYPHENE, URINE: NEGATIVE
RBC # BLD: 3.51 M/UL (ref 3.95–5.11)
RBC # BLD: ABNORMAL 10*6/UL
SEG NEUTROPHILS: 63 % (ref 36–65)
SEGMENTED NEUTROPHILS ABSOLUTE COUNT: 3.13 K/UL (ref 1.5–8.1)
TEST INFORMATION: ABNORMAL
TRICYCLIC ANTIDEPRESSANTS, UR: NEGATIVE
WBC # BLD: 5 K/UL (ref 3.5–11.3)
WBC # BLD: ABNORMAL 10*3/UL

## 2020-01-05 PROCEDURE — 3E033VJ INTRODUCTION OF OTHER HORMONE INTO PERIPHERAL VEIN, PERCUTANEOUS APPROACH: ICD-10-PCS | Performed by: OBSTETRICS & GYNECOLOGY

## 2020-01-05 PROCEDURE — 2580000003 HC RX 258: Performed by: OBSTETRICS & GYNECOLOGY

## 2020-01-05 PROCEDURE — 85025 COMPLETE CBC W/AUTO DIFF WBC: CPT

## 2020-01-05 PROCEDURE — 80306 DRUG TEST PRSMV INSTRMNT: CPT

## 2020-01-05 PROCEDURE — 6370000000 HC RX 637 (ALT 250 FOR IP): Performed by: OBSTETRICS & GYNECOLOGY

## 2020-01-05 PROCEDURE — 86850 RBC ANTIBODY SCREEN: CPT

## 2020-01-05 PROCEDURE — 1220000000 HC SEMI PRIVATE OB R&B

## 2020-01-05 PROCEDURE — 86901 BLOOD TYPING SEROLOGIC RH(D): CPT

## 2020-01-05 PROCEDURE — 36415 COLL VENOUS BLD VENIPUNCTURE: CPT

## 2020-01-05 PROCEDURE — 3E0P7VZ INTRODUCTION OF HORMONE INTO FEMALE REPRODUCTIVE, VIA NATURAL OR ARTIFICIAL OPENING: ICD-10-PCS | Performed by: OBSTETRICS & GYNECOLOGY

## 2020-01-05 PROCEDURE — 86900 BLOOD TYPING SEROLOGIC ABO: CPT

## 2020-01-05 RX ORDER — ACETAMINOPHEN 325 MG/1
650 TABLET ORAL EVERY 4 HOURS PRN
Status: DISCONTINUED | OUTPATIENT
Start: 2020-01-05 | End: 2020-01-06

## 2020-01-05 RX ORDER — MISOPROSTOL 100 UG/1
900 TABLET ORAL PRN
Status: DISCONTINUED | OUTPATIENT
Start: 2020-01-05 | End: 2020-01-06

## 2020-01-05 RX ORDER — CARBOPROST TROMETHAMINE 250 UG/ML
250 INJECTION, SOLUTION INTRAMUSCULAR PRN
Status: DISCONTINUED | OUTPATIENT
Start: 2020-01-05 | End: 2020-01-06

## 2020-01-05 RX ORDER — SODIUM CHLORIDE, SODIUM LACTATE, POTASSIUM CHLORIDE, CALCIUM CHLORIDE 600; 310; 30; 20 MG/100ML; MG/100ML; MG/100ML; MG/100ML
INJECTION, SOLUTION INTRAVENOUS CONTINUOUS
Status: DISCONTINUED | OUTPATIENT
Start: 2020-01-05 | End: 2020-01-06

## 2020-01-05 RX ORDER — LIDOCAINE HYDROCHLORIDE 10 MG/ML
30 INJECTION, SOLUTION EPIDURAL; INFILTRATION; INTRACAUDAL; PERINEURAL PRN
Status: DISCONTINUED | OUTPATIENT
Start: 2020-01-05 | End: 2020-01-06

## 2020-01-05 RX ORDER — SODIUM CHLORIDE 0.9 % (FLUSH) 0.9 %
10 SYRINGE (ML) INJECTION PRN
Status: DISCONTINUED | OUTPATIENT
Start: 2020-01-05 | End: 2020-01-06

## 2020-01-05 RX ORDER — ONDANSETRON 2 MG/ML
4 INJECTION INTRAMUSCULAR; INTRAVENOUS EVERY 6 HOURS PRN
Status: DISCONTINUED | OUTPATIENT
Start: 2020-01-05 | End: 2020-01-06

## 2020-01-05 RX ORDER — SODIUM CHLORIDE 0.9 % (FLUSH) 0.9 %
10 SYRINGE (ML) INJECTION EVERY 12 HOURS SCHEDULED
Status: DISCONTINUED | OUTPATIENT
Start: 2020-01-05 | End: 2020-01-06

## 2020-01-05 RX ORDER — SEVOFLURANE 250 ML/250ML
1 LIQUID RESPIRATORY (INHALATION) CONTINUOUS PRN
Status: DISCONTINUED | OUTPATIENT
Start: 2020-01-05 | End: 2020-01-06

## 2020-01-05 RX ORDER — METHYLERGONOVINE MALEATE 0.2 MG/ML
200 INJECTION INTRAVENOUS PRN
Status: DISCONTINUED | OUTPATIENT
Start: 2020-01-05 | End: 2020-01-06

## 2020-01-05 RX ORDER — NALBUPHINE HCL 10 MG/ML
10 AMPUL (ML) INJECTION
Status: DISCONTINUED | OUTPATIENT
Start: 2020-01-05 | End: 2020-01-06

## 2020-01-05 RX ADMIN — DINOPROSTONE 10 MG: 10 INSERT VAGINAL at 22:26

## 2020-01-05 RX ADMIN — Medication 10 ML: at 22:19

## 2020-01-06 ENCOUNTER — ANESTHESIA EVENT (OUTPATIENT)
Dept: LABOR AND DELIVERY | Age: 35
DRG: 540 | End: 2020-01-06
Payer: COMMERCIAL

## 2020-01-06 ENCOUNTER — ANESTHESIA (OUTPATIENT)
Dept: LABOR AND DELIVERY | Age: 35
DRG: 540 | End: 2020-01-06
Payer: COMMERCIAL

## 2020-01-06 VITALS
OXYGEN SATURATION: 97 % | TEMPERATURE: 98.6 F | RESPIRATION RATE: 14 BRPM | DIASTOLIC BLOOD PRESSURE: 54 MMHG | SYSTOLIC BLOOD PRESSURE: 102 MMHG

## 2020-01-06 LAB
ABO/RH: NORMAL
ANTIBODY SCREEN: NEGATIVE
ARM BAND NUMBER: NORMAL
EXPIRATION DATE: NORMAL

## 2020-01-06 PROCEDURE — 7100000000 HC PACU RECOVERY - FIRST 15 MIN: Performed by: OBSTETRICS & GYNECOLOGY

## 2020-01-06 PROCEDURE — 2580000003 HC RX 258: Performed by: NURSE ANESTHETIST, CERTIFIED REGISTERED

## 2020-01-06 PROCEDURE — 7100000001 HC PACU RECOVERY - ADDTL 15 MIN: Performed by: OBSTETRICS & GYNECOLOGY

## 2020-01-06 PROCEDURE — 3700000000 HC ANESTHESIA ATTENDED CARE: Performed by: OBSTETRICS & GYNECOLOGY

## 2020-01-06 PROCEDURE — 1220000000 HC SEMI PRIVATE OB R&B

## 2020-01-06 PROCEDURE — 3609079900 HC CESAREAN SECTION: Performed by: OBSTETRICS & GYNECOLOGY

## 2020-01-06 PROCEDURE — 2500000003 HC RX 250 WO HCPCS: Performed by: NURSE ANESTHETIST, CERTIFIED REGISTERED

## 2020-01-06 PROCEDURE — 2580000003 HC RX 258: Performed by: OBSTETRICS & GYNECOLOGY

## 2020-01-06 PROCEDURE — 3700000001 HC ADD 15 MINUTES (ANESTHESIA): Performed by: OBSTETRICS & GYNECOLOGY

## 2020-01-06 PROCEDURE — 64488 TAP BLOCK BI INJECTION: CPT | Performed by: NURSE ANESTHETIST, CERTIFIED REGISTERED

## 2020-01-06 PROCEDURE — 6360000002 HC RX W HCPCS: Performed by: NURSE ANESTHETIST, CERTIFIED REGISTERED

## 2020-01-06 PROCEDURE — 88307 TISSUE EXAM BY PATHOLOGIST: CPT

## 2020-01-06 PROCEDURE — 6370000000 HC RX 637 (ALT 250 FOR IP): Performed by: OBSTETRICS & GYNECOLOGY

## 2020-01-06 PROCEDURE — 6360000002 HC RX W HCPCS: Performed by: OBSTETRICS & GYNECOLOGY

## 2020-01-06 PROCEDURE — 2500000003 HC RX 250 WO HCPCS: Performed by: OBSTETRICS & GYNECOLOGY

## 2020-01-06 PROCEDURE — 59514 CESAREAN DELIVERY ONLY: CPT | Performed by: ADVANCED PRACTICE MIDWIFE

## 2020-01-06 PROCEDURE — 2709999900 HC NON-CHARGEABLE SUPPLY: Performed by: OBSTETRICS & GYNECOLOGY

## 2020-01-06 RX ORDER — SODIUM CHLORIDE 9 MG/ML
INJECTION INTRAVENOUS PRN
Status: DISCONTINUED | OUTPATIENT
Start: 2020-01-06 | End: 2020-01-06 | Stop reason: SDUPTHER

## 2020-01-06 RX ORDER — DEXAMETHASONE SODIUM PHOSPHATE 4 MG/ML
INJECTION, SOLUTION INTRA-ARTICULAR; INTRALESIONAL; INTRAMUSCULAR; INTRAVENOUS; SOFT TISSUE PRN
Status: DISCONTINUED | OUTPATIENT
Start: 2020-01-06 | End: 2020-01-06 | Stop reason: SDUPTHER

## 2020-01-06 RX ORDER — ONDANSETRON 2 MG/ML
4 INJECTION INTRAMUSCULAR; INTRAVENOUS EVERY 6 HOURS PRN
Status: DISCONTINUED | OUTPATIENT
Start: 2020-01-06 | End: 2020-01-08 | Stop reason: HOSPADM

## 2020-01-06 RX ORDER — SODIUM CHLORIDE 0.9 % (FLUSH) 0.9 %
10 SYRINGE (ML) INJECTION PRN
Status: DISCONTINUED | OUTPATIENT
Start: 2020-01-06 | End: 2020-01-06

## 2020-01-06 RX ORDER — SODIUM CHLORIDE, SODIUM LACTATE, POTASSIUM CHLORIDE, CALCIUM CHLORIDE 600; 310; 30; 20 MG/100ML; MG/100ML; MG/100ML; MG/100ML
INJECTION, SOLUTION INTRAVENOUS CONTINUOUS
Status: DISCONTINUED | OUTPATIENT
Start: 2020-01-06 | End: 2020-01-06

## 2020-01-06 RX ORDER — ONDANSETRON 2 MG/ML
INJECTION INTRAMUSCULAR; INTRAVENOUS PRN
Status: DISCONTINUED | OUTPATIENT
Start: 2020-01-06 | End: 2020-01-06 | Stop reason: SDUPTHER

## 2020-01-06 RX ORDER — SODIUM CHLORIDE 0.9 % (FLUSH) 0.9 %
10 SYRINGE (ML) INJECTION PRN
Status: DISCONTINUED | OUTPATIENT
Start: 2020-01-06 | End: 2020-01-08 | Stop reason: HOSPADM

## 2020-01-06 RX ORDER — DOCUSATE SODIUM 100 MG/1
100 CAPSULE, LIQUID FILLED ORAL 2 TIMES DAILY
Status: DISCONTINUED | OUTPATIENT
Start: 2020-01-06 | End: 2020-01-08 | Stop reason: HOSPADM

## 2020-01-06 RX ORDER — BUPIVACAINE HYDROCHLORIDE 5 MG/ML
INJECTION, SOLUTION EPIDURAL; INTRACAUDAL PRN
Status: DISCONTINUED | OUTPATIENT
Start: 2020-01-06 | End: 2020-01-06 | Stop reason: SDUPTHER

## 2020-01-06 RX ORDER — SODIUM CHLORIDE 0.9 % (FLUSH) 0.9 %
10 SYRINGE (ML) INJECTION EVERY 12 HOURS SCHEDULED
Status: DISCONTINUED | OUTPATIENT
Start: 2020-01-06 | End: 2020-01-06

## 2020-01-06 RX ORDER — DEXAMETHASONE SODIUM PHOSPHATE 10 MG/ML
INJECTION, SOLUTION INTRAMUSCULAR; INTRAVENOUS PRN
Status: DISCONTINUED | OUTPATIENT
Start: 2020-01-06 | End: 2020-01-06 | Stop reason: SDUPTHER

## 2020-01-06 RX ORDER — PHENYLEPHRINE HYDROCHLORIDE 10 MG/ML
INJECTION INTRAVENOUS PRN
Status: DISCONTINUED | OUTPATIENT
Start: 2020-01-06 | End: 2020-01-06 | Stop reason: SDUPTHER

## 2020-01-06 RX ORDER — TRISODIUM CITRATE DIHYDRATE AND CITRIC ACID MONOHYDRATE 500; 334 MG/5ML; MG/5ML
30 SOLUTION ORAL ONCE
Status: COMPLETED | OUTPATIENT
Start: 2020-01-06 | End: 2020-01-06

## 2020-01-06 RX ORDER — KETOROLAC TROMETHAMINE 30 MG/ML
INJECTION, SOLUTION INTRAMUSCULAR; INTRAVENOUS PRN
Status: DISCONTINUED | OUTPATIENT
Start: 2020-01-06 | End: 2020-01-06 | Stop reason: SDUPTHER

## 2020-01-06 RX ORDER — METOCLOPRAMIDE HYDROCHLORIDE 5 MG/ML
10 INJECTION INTRAMUSCULAR; INTRAVENOUS ONCE
Status: COMPLETED | OUTPATIENT
Start: 2020-01-06 | End: 2020-01-06

## 2020-01-06 RX ORDER — NALOXONE HYDROCHLORIDE 0.4 MG/ML
0.4 INJECTION, SOLUTION INTRAMUSCULAR; INTRAVENOUS; SUBCUTANEOUS PRN
Status: DISCONTINUED | OUTPATIENT
Start: 2020-01-06 | End: 2020-01-08 | Stop reason: HOSPADM

## 2020-01-06 RX ORDER — SODIUM CHLORIDE 0.9 % (FLUSH) 0.9 %
10 SYRINGE (ML) INJECTION EVERY 12 HOURS SCHEDULED
Status: DISCONTINUED | OUTPATIENT
Start: 2020-01-06 | End: 2020-01-08 | Stop reason: HOSPADM

## 2020-01-06 RX ORDER — SODIUM CHLORIDE, SODIUM LACTATE, POTASSIUM CHLORIDE, CALCIUM CHLORIDE 600; 310; 30; 20 MG/100ML; MG/100ML; MG/100ML; MG/100ML
INJECTION, SOLUTION INTRAVENOUS CONTINUOUS
Status: DISCONTINUED | OUTPATIENT
Start: 2020-01-06 | End: 2020-01-08 | Stop reason: HOSPADM

## 2020-01-06 RX ORDER — METHYLERGONOVINE MALEATE 0.2 MG/ML
200 INJECTION INTRAVENOUS PRN
Status: DISCONTINUED | OUTPATIENT
Start: 2020-01-06 | End: 2020-01-08 | Stop reason: HOSPADM

## 2020-01-06 RX ORDER — ACETAMINOPHEN 325 MG/1
650 TABLET ORAL EVERY 4 HOURS PRN
Status: DISCONTINUED | OUTPATIENT
Start: 2020-01-06 | End: 2020-01-08 | Stop reason: HOSPADM

## 2020-01-06 RX ORDER — SODIUM CHLORIDE, SODIUM LACTATE, POTASSIUM CHLORIDE, CALCIUM CHLORIDE 600; 310; 30; 20 MG/100ML; MG/100ML; MG/100ML; MG/100ML
1000 INJECTION, SOLUTION INTRAVENOUS ONCE
Status: COMPLETED | OUTPATIENT
Start: 2020-01-06 | End: 2020-01-06

## 2020-01-06 RX ORDER — IBUPROFEN 800 MG/1
800 TABLET ORAL EVERY 8 HOURS
Status: DISCONTINUED | OUTPATIENT
Start: 2020-01-07 | End: 2020-01-08 | Stop reason: HOSPADM

## 2020-01-06 RX ADMIN — SODIUM CHLORIDE, POTASSIUM CHLORIDE, SODIUM LACTATE AND CALCIUM CHLORIDE: 600; 310; 30; 20 INJECTION, SOLUTION INTRAVENOUS at 14:20

## 2020-01-06 RX ADMIN — PHENYLEPHRINE HYDROCHLORIDE 100 MCG: 10 INJECTION INTRAVENOUS at 21:24

## 2020-01-06 RX ADMIN — PHENYLEPHRINE HYDROCHLORIDE 200 MCG: 10 INJECTION INTRAVENOUS at 21:04

## 2020-01-06 RX ADMIN — DEXAMETHASONE SODIUM PHOSPHATE 5 MG: 10 INJECTION, SOLUTION INTRAMUSCULAR; INTRAVENOUS at 21:41

## 2020-01-06 RX ADMIN — ACETAMINOPHEN 650 MG: 325 TABLET, FILM COATED ORAL at 11:57

## 2020-01-06 RX ADMIN — DEXAMETHASONE SODIUM PHOSPHATE 5 MG: 10 INJECTION, SOLUTION INTRAMUSCULAR; INTRAVENOUS at 21:43

## 2020-01-06 RX ADMIN — SODIUM CITRATE AND CITRIC ACID MONOHYDRATE 30 ML: 500; 334 SOLUTION ORAL at 19:54

## 2020-01-06 RX ADMIN — Medication 10 ML: at 01:16

## 2020-01-06 RX ADMIN — PHENYLEPHRINE HYDROCHLORIDE 200 MCG: 10 INJECTION INTRAVENOUS at 21:02

## 2020-01-06 RX ADMIN — METOCLOPRAMIDE 10 MG: 5 INJECTION, SOLUTION INTRAMUSCULAR; INTRAVENOUS at 19:54

## 2020-01-06 RX ADMIN — SODIUM CHLORIDE, POTASSIUM CHLORIDE, SODIUM LACTATE AND CALCIUM CHLORIDE: 600; 310; 30; 20 INJECTION, SOLUTION INTRAVENOUS at 21:11

## 2020-01-06 RX ADMIN — KETOROLAC TROMETHAMINE 10 MG: 30 INJECTION, SOLUTION INTRAMUSCULAR; INTRAVENOUS at 21:25

## 2020-01-06 RX ADMIN — ONDANSETRON 4 MG: 2 INJECTION INTRAMUSCULAR; INTRAVENOUS at 01:16

## 2020-01-06 RX ADMIN — SODIUM CHLORIDE, POTASSIUM CHLORIDE, SODIUM LACTATE AND CALCIUM CHLORIDE: 600; 310; 30; 20 INJECTION, SOLUTION INTRAVENOUS at 20:35

## 2020-01-06 RX ADMIN — FAMOTIDINE 20 MG: 10 INJECTION INTRAVENOUS at 19:54

## 2020-01-06 RX ADMIN — BUPIVACAINE HYDROCHLORIDE 15 ML: 5 INJECTION, SOLUTION EPIDURAL; INTRACAUDAL; PERINEURAL at 21:43

## 2020-01-06 RX ADMIN — Medication 1 MILLI-UNITS/MIN: at 14:25

## 2020-01-06 RX ADMIN — PHENYLEPHRINE HYDROCHLORIDE 100 MCG: 10 INJECTION INTRAVENOUS at 21:00

## 2020-01-06 RX ADMIN — BUPIVACAINE HYDROCHLORIDE 15 ML: 5 INJECTION, SOLUTION EPIDURAL; INTRACAUDAL; PERINEURAL at 21:41

## 2020-01-06 RX ADMIN — BUPIVACAINE HYDROCHLORIDE 3 ML: 5 INJECTION, SOLUTION EPIDURAL; INTRACAUDAL; PERINEURAL at 20:59

## 2020-01-06 RX ADMIN — ONDANSETRON 4 MG: 2 INJECTION INTRAMUSCULAR; INTRAVENOUS at 21:25

## 2020-01-06 RX ADMIN — CEFOXITIN SODIUM 2 G: 2 POWDER, FOR SOLUTION INTRAVENOUS at 20:30

## 2020-01-06 RX ADMIN — DEXAMETHASONE SODIUM PHOSPHATE 4 MG: 4 INJECTION, SOLUTION INTRAMUSCULAR; INTRAVENOUS at 21:25

## 2020-01-06 RX ADMIN — ONDANSETRON 4 MG: 2 INJECTION INTRAMUSCULAR; INTRAVENOUS at 16:49

## 2020-01-06 RX ADMIN — OXYTOCIN-SODIUM CHLORIDE 0.9% IV SOLN 30 UNIT/500ML 1 ML: 30-0.9/5 SOLUTION at 21:16

## 2020-01-06 RX ADMIN — SODIUM CHLORIDE 15 ML: 9 INJECTION, SOLUTION INTRAMUSCULAR; INTRAVENOUS; SUBCUTANEOUS at 21:41

## 2020-01-06 RX ADMIN — SODIUM CHLORIDE 15 ML: 9 INJECTION, SOLUTION INTRAMUSCULAR; INTRAVENOUS; SUBCUTANEOUS at 21:43

## 2020-01-06 ASSESSMENT — PULMONARY FUNCTION TESTS
PIF_VALUE: 0

## 2020-01-06 ASSESSMENT — LIFESTYLE VARIABLES: SMOKING_STATUS: 0

## 2020-01-06 ASSESSMENT — PAIN SCALES - GENERAL: PAINLEVEL_OUTOF10: 3

## 2020-01-06 NOTE — PROGRESS NOTES
Pt arrives to department with spouse for scheduled induction. Pt instructed to change into gown and provides urine specimen.

## 2020-01-06 NOTE — PROGRESS NOTES
Dr. Darryle Olive updated via phone regarding SVE 1.5/40/-2. Ok to remove from monitors to shower and eat. Plan for AROM after lunch per Dr. Darryle Olive. Patient updated and agrees. Monitors removed. IV wrapped. Menu, towels, and new gown provided.

## 2020-01-07 LAB
ABSOLUTE EOS #: 0 K/UL (ref 0–0.44)
ABSOLUTE IMMATURE GRANULOCYTE: 0 K/UL (ref 0–0.3)
ABSOLUTE LYMPH #: 1.04 K/UL (ref 1.1–3.7)
ABSOLUTE MONO #: 0 K/UL (ref 0.1–1.2)
BASOPHILS # BLD: 0 % (ref 0–2)
BASOPHILS ABSOLUTE: 0 K/UL (ref 0–0.2)
DIFFERENTIAL TYPE: ABNORMAL
EOSINOPHILS RELATIVE PERCENT: 0 % (ref 1–4)
HCT VFR BLD CALC: 28.7 % (ref 36.3–47.1)
HEMOGLOBIN: 9 G/DL (ref 11.9–15.1)
IMMATURE GRANULOCYTES: 0 %
LYMPHOCYTES # BLD: 10 % (ref 24–43)
MCH RBC QN AUTO: 28.5 PG (ref 25.2–33.5)
MCHC RBC AUTO-ENTMCNC: 31.4 G/DL (ref 28.4–34.8)
MCV RBC AUTO: 90.8 FL (ref 82.6–102.9)
MONOCYTES # BLD: 0 % (ref 3–12)
MORPHOLOGY: NORMAL
NRBC AUTOMATED: 0 PER 100 WBC
PDW BLD-RTO: 12.5 % (ref 11.8–14.4)
PLATELET # BLD: ABNORMAL K/UL (ref 138–453)
PLATELET ESTIMATE: ABNORMAL
PLATELET, FLUORESCENCE: 132 K/UL (ref 138–453)
PLATELET, IMMATURE FRACTION: 7.7 % (ref 1.1–10.3)
PMV BLD AUTO: ABNORMAL FL (ref 8.1–13.5)
RBC # BLD: 3.16 M/UL (ref 3.95–5.11)
RBC # BLD: ABNORMAL 10*6/UL
SEG NEUTROPHILS: 90 % (ref 36–65)
SEGMENTED NEUTROPHILS ABSOLUTE COUNT: 9.36 K/UL (ref 1.5–8.1)
WBC # BLD: 10.4 K/UL (ref 3.5–11.3)
WBC # BLD: ABNORMAL 10*3/UL

## 2020-01-07 PROCEDURE — 1220000000 HC SEMI PRIVATE OB R&B

## 2020-01-07 PROCEDURE — 36415 COLL VENOUS BLD VENIPUNCTURE: CPT

## 2020-01-07 PROCEDURE — 6360000002 HC RX W HCPCS: Performed by: OBSTETRICS & GYNECOLOGY

## 2020-01-07 PROCEDURE — 2580000003 HC RX 258: Performed by: OBSTETRICS & GYNECOLOGY

## 2020-01-07 PROCEDURE — 85025 COMPLETE CBC W/AUTO DIFF WBC: CPT

## 2020-01-07 PROCEDURE — 85055 RETICULATED PLATELET ASSAY: CPT

## 2020-01-07 PROCEDURE — 6370000000 HC RX 637 (ALT 250 FOR IP): Performed by: OBSTETRICS & GYNECOLOGY

## 2020-01-07 RX ORDER — OXYCODONE HYDROCHLORIDE AND ACETAMINOPHEN 5; 325 MG/1; MG/1
1 TABLET ORAL EVERY 4 HOURS PRN
Status: DISCONTINUED | OUTPATIENT
Start: 2020-01-07 | End: 2020-01-08 | Stop reason: HOSPADM

## 2020-01-07 RX ORDER — IBUPROFEN 800 MG/1
800 TABLET ORAL EVERY 6 HOURS PRN
Status: DISCONTINUED | OUTPATIENT
Start: 2020-01-07 | End: 2020-01-07

## 2020-01-07 RX ORDER — ZOLPIDEM TARTRATE 5 MG/1
5 TABLET ORAL NIGHTLY PRN
Status: DISCONTINUED | OUTPATIENT
Start: 2020-01-07 | End: 2020-01-08 | Stop reason: HOSPADM

## 2020-01-07 RX ORDER — KETOROLAC TROMETHAMINE 30 MG/ML
30 INJECTION, SOLUTION INTRAMUSCULAR; INTRAVENOUS EVERY 6 HOURS
Status: DISCONTINUED | OUTPATIENT
Start: 2020-01-07 | End: 2020-01-07

## 2020-01-07 RX ORDER — KETOROLAC TROMETHAMINE 30 MG/ML
30 INJECTION, SOLUTION INTRAMUSCULAR; INTRAVENOUS EVERY 6 HOURS PRN
Status: DISCONTINUED | OUTPATIENT
Start: 2020-01-07 | End: 2020-01-07

## 2020-01-07 RX ADMIN — DOCUSATE SODIUM 100 MG: 100 CAPSULE, LIQUID FILLED ORAL at 20:42

## 2020-01-07 RX ADMIN — KETOROLAC TROMETHAMINE 30 MG: 30 INJECTION, SOLUTION INTRAMUSCULAR; INTRAVENOUS at 03:31

## 2020-01-07 RX ADMIN — OXYCODONE HYDROCHLORIDE AND ACETAMINOPHEN 1 TABLET: 5; 325 TABLET ORAL at 22:44

## 2020-01-07 RX ADMIN — DOCUSATE SODIUM 100 MG: 100 CAPSULE, LIQUID FILLED ORAL at 10:22

## 2020-01-07 RX ADMIN — ENOXAPARIN SODIUM 40 MG: 40 INJECTION SUBCUTANEOUS at 10:23

## 2020-01-07 RX ADMIN — OXYCODONE HYDROCHLORIDE AND ACETAMINOPHEN 1 TABLET: 5; 325 TABLET ORAL at 13:18

## 2020-01-07 RX ADMIN — ZOLPIDEM TARTRATE 5 MG: 5 TABLET ORAL at 22:48

## 2020-01-07 RX ADMIN — OXYCODONE HYDROCHLORIDE AND ACETAMINOPHEN 1 TABLET: 5; 325 TABLET ORAL at 00:47

## 2020-01-07 RX ADMIN — SODIUM CHLORIDE, POTASSIUM CHLORIDE, SODIUM LACTATE AND CALCIUM CHLORIDE: 600; 310; 30; 20 INJECTION, SOLUTION INTRAVENOUS at 01:47

## 2020-01-07 RX ADMIN — OXYCODONE HYDROCHLORIDE AND ACETAMINOPHEN 1 TABLET: 5; 325 TABLET ORAL at 05:46

## 2020-01-07 RX ADMIN — OXYCODONE HYDROCHLORIDE AND ACETAMINOPHEN 1 TABLET: 5; 325 TABLET ORAL at 18:32

## 2020-01-07 RX ADMIN — KETOROLAC TROMETHAMINE 30 MG: 30 INJECTION, SOLUTION INTRAMUSCULAR at 10:23

## 2020-01-07 RX ADMIN — ZOLPIDEM TARTRATE 5 MG: 5 TABLET ORAL at 01:04

## 2020-01-07 RX ADMIN — IBUPROFEN 800 MG: 800 TABLET, FILM COATED ORAL at 16:49

## 2020-01-07 ASSESSMENT — PAIN SCALES - GENERAL
PAINLEVEL_OUTOF10: 5
PAINLEVEL_OUTOF10: 9
PAINLEVEL_OUTOF10: 6
PAINLEVEL_OUTOF10: 4
PAINLEVEL_OUTOF10: 7
PAINLEVEL_OUTOF10: 3
PAINLEVEL_OUTOF10: 7
PAINLEVEL_OUTOF10: 7

## 2020-01-07 NOTE — ANESTHESIA PROCEDURE NOTES
Peripheral Block    Patient location during procedure: OR  Start time: 1/6/2020 9:39 PM  End time: 1/6/2020 9:44 PM  Staffing  Resident/CRNA: JACKIE Wolf CRNA  Performed: resident/CRNA   Preanesthetic Checklist  Completed: patient identified, surgical consent, pre-op evaluation, timeout performed, IV checked, risks and benefits discussed, monitors and equipment checked, anesthesia consent given, oxygen available and patient being monitored  Peripheral Block  Patient position: supine  Prep: ChloraPrep  Patient monitoring: cardiac monitor, continuous pulse ox, frequent blood pressure checks and IV access  Block type: TAP  Laterality: bilateral  Injection technique: single-shot  Procedures: ultrasound guided  Local infiltration: bupivacaine and decadron  Provider prep: mask  Local infiltration: bupivacaine and decadron  Needle  Needle type: SONOTAP. Needle gauge: 21 G  Needle length: 11 cm  Needle localization: ultrasound guidance  Assessment  Injection assessment: negative aspiration for heme, no paresthesia on injection and local visualized surrounding nerve on ultrasound  Paresthesia pain: none  Slow fractionated injection: yes  Hemodynamics: stable  Additional Notes  Meds as charted.    Reason for block: post-op pain management and at surgeon's request

## 2020-01-07 NOTE — PROGRESS NOTES
Dr Kim Lombardi notified via telephone to ask if he wanted a second bag of pitocin ordered for pt. States that one bag is ok. No orders received.

## 2020-01-07 NOTE — ANESTHESIA POSTPROCEDURE EVALUATION
Department of Anesthesiology  Postprocedure Note    Patient: Gonzalez Smart  MRN: [id number]  YOB: 1985  Date of evaluation: 2020  Time:  11:30 AM     Procedure Summary     Date:  20 Room / Location:  [facility] AT THE [facility]    Anesthesia Start:   Anesthesia Stop:      Procedure:   SECTION (N/A ) Diagnosis:  (cervical stenosis)    Surgeon:  Jenae Hogue MD Responsible Provider:  JACKIE Townsend - CRNA    Anesthesia Type:  regional, spinal ASA Status:  2          Anesthesia Type: regional, spinal    Kae Phase I: Kae Score: 9    Kae Phase II: Kae Score: 9    Last vitals: Reviewed and per EMR flowsheets. Anesthesia Post Evaluation    Patient location during evaluation: bedside  Patient participation: complete - patient participated  Level of consciousness: awake and alert  Pain score: 3 (At incision site. sore.)  Airway patency: patent  Nausea & Vomiting: no nausea and no vomiting  Complications: no  Cardiovascular status: hemodynamically stable  Respiratory status: acceptable  Hydration status: stable  Comments: Pt. Reports discomfort at the incision site. The patient reports that she has full function of both legs. The patient also states that she has full feeling in her right side but her left abdomen from the ribs to the hip is still numb. I explained to the patient that the TAP block on right most likely wore off while the TAP block on the left was still providing relief and that it could be another 24 hours before she starts regaining feeling on that part of her abdomen.

## 2020-01-07 NOTE — ANESTHESIA PROCEDURE NOTES
Spinal Block    Start time: 1/6/2020 8:56 PM  End time: 1/6/2020 8:59 PM  Reason for block: primary anesthetic  Staffing  Resident/CRNA: JACKIE Arnold CRNA  Performed: resident/CRNA   Preanesthetic Checklist  Completed: patient identified, site marked, surgical consent, pre-op evaluation, timeout performed, IV checked, risks and benefits discussed, monitors and equipment checked, anesthesia consent given, oxygen available and patient being monitored  Spinal Block  Patient position: sitting  Prep: ChloraPrep  Patient monitoring: cardiac monitor, continuous pulse ox and frequent blood pressure checks  Approach: midline  Location: L3/L4  Provider prep: mask and sterile gloves  Local infiltration: lidocaine  Agent: bupivacaine  Needle  Needle type: pencil-tip   Needle gauge: 24 G  Needle length: 3.5 in  Assessment  Events: cerebrospinal fluid  Swirl obtained: Yes  CSF: clear  Attempts: 1  Hemodynamics: stable  Additional Notes  Meds as charted, pt tolerated wlel.

## 2020-01-08 VITALS
WEIGHT: 154 LBS | TEMPERATURE: 97.9 F | RESPIRATION RATE: 18 BRPM | HEART RATE: 83 BPM | SYSTOLIC BLOOD PRESSURE: 123 MMHG | HEIGHT: 66 IN | OXYGEN SATURATION: 94 % | DIASTOLIC BLOOD PRESSURE: 81 MMHG | BODY MASS INDEX: 24.75 KG/M2

## 2020-01-08 LAB — SURGICAL PATHOLOGY REPORT: NORMAL

## 2020-01-08 PROCEDURE — 6360000002 HC RX W HCPCS: Performed by: OBSTETRICS & GYNECOLOGY

## 2020-01-08 PROCEDURE — 6370000000 HC RX 637 (ALT 250 FOR IP): Performed by: OBSTETRICS & GYNECOLOGY

## 2020-01-08 RX ORDER — PSEUDOEPHEDRINE HCL 30 MG
100 TABLET ORAL 2 TIMES DAILY
Qty: 8 CAPSULE | Refills: 0 | Status: SHIPPED | OUTPATIENT
Start: 2020-01-08 | End: 2020-12-03 | Stop reason: ALTCHOICE

## 2020-01-08 RX ORDER — OXYCODONE HYDROCHLORIDE AND ACETAMINOPHEN 5; 325 MG/1; MG/1
1 TABLET ORAL EVERY 6 HOURS PRN
Qty: 12 TABLET | Refills: 0 | Status: SHIPPED | OUTPATIENT
Start: 2020-01-08 | End: 2020-01-11

## 2020-01-08 RX ORDER — IBUPROFEN 800 MG/1
800 TABLET ORAL EVERY 8 HOURS
Qty: 120 TABLET | Refills: 3 | Status: SHIPPED | OUTPATIENT
Start: 2020-01-08 | End: 2022-03-11

## 2020-01-08 RX ADMIN — ACETAMINOPHEN 650 MG: 325 TABLET, FILM COATED ORAL at 17:32

## 2020-01-08 RX ADMIN — IBUPROFEN 800 MG: 800 TABLET, FILM COATED ORAL at 04:25

## 2020-01-08 RX ADMIN — IBUPROFEN 800 MG: 800 TABLET, FILM COATED ORAL at 12:28

## 2020-01-08 RX ADMIN — ENOXAPARIN SODIUM 40 MG: 40 INJECTION SUBCUTANEOUS at 11:40

## 2020-01-08 RX ADMIN — DOCUSATE SODIUM 100 MG: 100 CAPSULE, LIQUID FILLED ORAL at 10:30

## 2020-01-08 RX ADMIN — OXYCODONE HYDROCHLORIDE AND ACETAMINOPHEN 1 TABLET: 5; 325 TABLET ORAL at 04:26

## 2020-01-08 ASSESSMENT — PAIN SCALES - GENERAL
PAINLEVEL_OUTOF10: 4
PAINLEVEL_OUTOF10: 5
PAINLEVEL_OUTOF10: 5
PAINLEVEL_OUTOF10: 3

## 2020-01-08 NOTE — DISCHARGE SUMMARY
Obstetrical Discharge Form        Gestational Age:39w3d    Antepartum complications: none    Date of Delivery: 1/6/20    Type of Delivery: C-sec      Delivered By:  Dr. Charlette Dobson By: Jose Francisco Karen: male        Intrapartum complications: none    Feeding method: bottle    Blood type: O POSITIVE      Rubella:    Rubella Antibody, IGG   Date Value Ref Range Status   05/22/2019 >500.0 IU/mL Final     Comment:                 REFERENCE RANGE:  <5.0       NON-REACTIVE (non-immune)  5.0 TO 9.9 EQUIVOCAL  >=10.0     REACTIVE     (immune)             T. Pallidium, IGG:    T. pallidum, IgG   Date Value Ref Range Status   05/22/2019 NONREACTIVE NONREACTIVE Final     Comment:           T. pallidum antibodies are not detected. There is no serological evidence of infection with T. pallidum (early primary syphilis   cannot be excluded). Retest in 2-4 weeks if syphilis is clinically suspect. Hepatitis B Surface Antigen:   Hepatitis B Surface Ag   Date Value Ref Range Status   05/22/2019 NONREACTIVE NONREACTIVE Final     HIV:    HIV 1/2 Antibody   Date Value Ref Range Status   05/31/2012 NONREACTIVE NR Final     Comment:                   Interpretation:         The presence of antibody to HIV and its association with the potential   infectivity, transmission or diagnosis of AIDS has not been established. Furthermore, a 'Non-Reactive' test result does not exclude the possibility of   exposure to or infection with HIV.   If the above test result is 'Reactive', the Laboratory will order the   confirmatory test.  [place] [id number] [phone]       Results for orders placed or performed during the hospital encounter of 01/05/20   CBC auto differential   Result Value Ref Range    WBC 5.0 3.5 - 11.3 k/uL    RBC 3.51 (L) 3.95 - 5.11 m/uL    Hemoglobin 9.9 (L) 11.9 - 15.1 g/dL    Hematocrit 32.2 (L) 36.3 - 47.1 %    MCV 91.7 82.6 - 102.9 fL    MCH 28.2 25.2 - 33.5 pg

## 2020-01-08 NOTE — PLAN OF CARE
Piedad Christopher RN  Outcome: Met This Shift     Problem: Venous Thromboembolism:  Goal: Will show no signs or symptoms of venous thromboembolism  Description  Will show no signs or symptoms of venous thromboembolism  1/7/2020 2059 by Lizzy Killian RN  Outcome: Ongoing  1/7/2020 1907 by Ranulfo Trivedi RN  Outcome: Met This Shift  Goal: Absence of signs or symptoms of impaired coagulation  Description  Absence of signs or symptoms of impaired coagulation  1/7/2020 2059 by Lizzy Killian RN  Outcome: Ongoing  1/7/2020 1907 by Ranulfo Trivedi RN  Outcome: Met This Shift     Problem: Pain:  Description  Pain management should include both nonpharmacologic and pharmacologic interventions.   Goal: Pain level will decrease  Description  Pain level will decrease  1/7/2020 2059 by Lizzy Killian RN  Outcome: Ongoing  1/7/2020 1907 by Ranulfo Trivedi RN  Outcome: Met This Shift  Goal: Control of acute pain  Description  Control of acute pain  1/7/2020 2059 by Lizzy Killian RN  Outcome: Ongoing  1/7/2020 1907 by Ranulfo Trivedi RN  Outcome: Met This Shift  Goal: Control of chronic pain  Description  Control of chronic pain  1/7/2020 2059 by Lizzy Killian RN  Outcome: Ongoing  1/7/2020 1907 by Ranulfo Trivedi RN  Outcome: Met This Shift

## 2020-01-08 NOTE — PLAN OF CARE
Problem: Discharge Planning:  Goal: Discharged to appropriate level of care  Description  Discharged to appropriate level of care  Outcome: Met This Shift     Problem: Fluid Volume - Imbalance:  Goal: Absence of postpartum hemorrhage signs and symptoms  Description  Absence of postpartum hemorrhage signs and symptoms  Outcome: Met This Shift  Goal: Absence of imbalanced fluid volume signs and symptoms  Description  Absence of imbalanced fluid volume signs and symptoms  Outcome: Met This Shift     Problem: Infection - Surgical Site:  Goal: Will show no infection signs and symptoms  Description  Will show no infection signs and symptoms  Outcome: Met This Shift     Problem: Mood - Altered:  Goal: Mood stable  Description  Mood stable  Outcome: Met This Shift     Problem: Nausea/Vomiting:  Goal: Absence of nausea/vomiting  Description  Absence of nausea/vomiting  Outcome: Met This Shift     Problem: Pain - Acute:  Goal: Pain level will decrease  Description  Pain level will decrease  Outcome: Met This Shift     Problem: Urinary Retention:  Goal: Urinary elimination within specified parameters  Description  Urinary elimination within specified parameters  Outcome: Met This Shift     Problem: Venous Thromboembolism:  Goal: Will show no signs or symptoms of venous thromboembolism  Description  Will show no signs or symptoms of venous thromboembolism  Outcome: Met This Shift  Goal: Absence of signs or symptoms of impaired coagulation  Description  Absence of signs or symptoms of impaired coagulation  Outcome: Met This Shift     Problem: Pain:  Goal: Pain level will decrease  Description  Pain level will decrease  Outcome: Met This Shift  Goal: Control of acute pain  Description  Control of acute pain  Outcome: Met This Shift  Goal: Control of chronic pain  Description  Control of chronic pain  Outcome: Met This Shift

## 2020-01-08 NOTE — PROGRESS NOTES
C/S  Labor and Delivery                                                                                     Post Partum Progress Note             Flatus: +  Bm: 0  Diet: Tolerating regular diet   Ambulation: Ambulates    OBJECTIVE:      Vitals:  VSS    ABDOMEN:  NT  INCISION: C/D NSI  GENITAL/URINARY:  normal  Cor: RRR no Murmurs  Pulmonary: clear to auscultation anterior and posterior  Extremities: no Clubbing cyanosis or ecchymosis    DATA:      ASSESSMENT:         S/P C/S post op day #  2     PLAN:  F/U in am

## 2020-04-01 ENCOUNTER — HOSPITAL ENCOUNTER (OUTPATIENT)
Age: 35
Discharge: HOME | End: 2020-04-01
Payer: COMMERCIAL

## 2020-04-01 LAB
-: ABNORMAL
AMORPHOUS: ABNORMAL
BACTERIA: ABNORMAL
BILIRUBIN URINE: NEGATIVE
CASTS UA: ABNORMAL /LPF
COLOR: YELLOW
COMMENT UA: ABNORMAL
CRYSTALS, UA: ABNORMAL /HPF
EPITHELIAL CELLS UA: ABNORMAL /HPF (ref 0–25)
GLUCOSE URINE: NEGATIVE
KETONES, URINE: NEGATIVE
LEUKOCYTE ESTERASE, URINE: NEGATIVE
MUCUS: ABNORMAL
NITRITE, URINE: NEGATIVE
OTHER OBSERVATIONS UA: ABNORMAL
PH UA: 6 (ref 5–9)
PROTEIN UA: NEGATIVE
RBC UA: ABNORMAL /HPF (ref 0–2)
RENAL EPITHELIAL, UA: ABNORMAL /HPF
SPECIFIC GRAVITY UA: 1.02 (ref 1.01–1.02)
TRICHOMONAS: ABNORMAL
TURBIDITY: CLEAR
URINE HGB: ABNORMAL
UROBILINOGEN, URINE: NORMAL
WBC UA: ABNORMAL /HPF (ref 0–5)
YEAST: ABNORMAL

## 2020-04-01 PROCEDURE — 81001 URINALYSIS AUTO W/SCOPE: CPT

## 2020-04-01 PROCEDURE — 87086 URINE CULTURE/COLONY COUNT: CPT

## 2020-04-02 LAB
CULTURE: NORMAL
Lab: NORMAL
SPECIMEN DESCRIPTION: NORMAL

## 2020-04-07 ENCOUNTER — HOSPITAL ENCOUNTER (OUTPATIENT)
Age: 35
Discharge: HOME | End: 2020-04-07
Payer: COMMERCIAL

## 2020-04-07 PROCEDURE — 88112 CYTOPATH CELL ENHANCE TECH: CPT

## 2020-04-08 LAB
CASE NUMBER:: NORMAL
SPECIMEN DESCRIPTION: NORMAL
SURGICAL PATHOLOGY REPORT: NORMAL

## 2020-04-22 ENCOUNTER — HOSPITAL ENCOUNTER (EMERGENCY)
Age: 35
Discharge: HOME | End: 2020-04-22
Attending: EMERGENCY MEDICINE
Payer: COMMERCIAL

## 2020-04-22 VITALS
TEMPERATURE: 97.8 F | RESPIRATION RATE: 16 BRPM | DIASTOLIC BLOOD PRESSURE: 72 MMHG | HEART RATE: 56 BPM | SYSTOLIC BLOOD PRESSURE: 131 MMHG | OXYGEN SATURATION: 99 %

## 2020-04-22 PROCEDURE — 6370000000 HC RX 637 (ALT 250 FOR IP): Performed by: EMERGENCY MEDICINE

## 2020-04-22 PROCEDURE — 99282 EMERGENCY DEPT VISIT SF MDM: CPT

## 2020-04-22 PROCEDURE — 10060 I&D ABSCESS SIMPLE/SINGLE: CPT

## 2020-04-22 RX ORDER — DOXYCYCLINE HYCLATE 100 MG/1
100 CAPSULE ORAL ONCE
Status: COMPLETED | OUTPATIENT
Start: 2020-04-22 | End: 2020-04-22

## 2020-04-22 RX ORDER — DOXYCYCLINE HYCLATE 100 MG/1
100 CAPSULE ORAL 2 TIMES DAILY
Qty: 14 CAPSULE | Refills: 0 | Status: SHIPPED | OUTPATIENT
Start: 2020-04-22 | End: 2020-04-29

## 2020-04-22 RX ORDER — LORATADINE 10 MG/1
10 TABLET ORAL DAILY
COMMUNITY

## 2020-04-22 RX ADMIN — DOXYCYCLINE HYCLATE 100 MG: 100 CAPSULE ORAL at 22:01

## 2020-04-22 ASSESSMENT — ENCOUNTER SYMPTOMS
SHORTNESS OF BREATH: 0
ABDOMINAL PAIN: 0
EYE PAIN: 0

## 2020-04-22 ASSESSMENT — PAIN SCALES - GENERAL: PAINLEVEL_OUTOF10: 2

## 2020-04-22 ASSESSMENT — PAIN DESCRIPTION - PAIN TYPE: TYPE: ACUTE PAIN

## 2020-04-23 NOTE — ED PROVIDER NOTES
 Cervical Cancer Sister     Cancer Maternal Grandfather         lung ca        SOCIAL HISTORY       Social History     Socioeconomic History    Marital status:      Spouse name: None    Number of children: None    Years of education: None    Highest education level: None   Occupational History    None   Social Needs    Financial resource strain: None    Food insecurity     Worry: None     Inability: None    Transportation needs     Medical: None     Non-medical: None   Tobacco Use    Smoking status: Never Smoker    Smokeless tobacco: Never Used   Substance and Sexual Activity    Alcohol use: Not Currently     Frequency: Monthly or less     Binge frequency: Never     Comment: socially mostly holidays    Drug use: Never    Sexual activity: Yes     Partners: Male     Birth control/protection: I.U.D. Lifestyle    Physical activity     Days per week: None     Minutes per session: None    Stress: None   Relationships    Social connections     Talks on phone: None     Gets together: None     Attends Restorationist service: None     Active member of club or organization: None     Attends meetings of clubs or organizations: None     Relationship status: None    Intimate partner violence     Fear of current or ex partner: None     Emotionally abused: None     Physically abused: None     Forced sexual activity: None   Other Topics Concern    None   Social History Narrative    ** Merged History Encounter **            REVIEW OF SYSTEMS       Review of Systems   Constitutional: Negative for fever. HENT: Negative for congestion. Eyes: Negative for pain. Respiratory: Negative for shortness of breath. Cardiovascular: Negative for chest pain. Gastrointestinal: Negative for abdominal pain. Genitourinary: Negative for dysuria. Skin: Positive for wound. Allergic/Immunologic: Negative for immunocompromised state. Neurological: Negative for headaches.        PHYSICAL EXAM    (up to 7 for (VIBRAMYCIN) capsule 100 mg (100 mg Oral Given 4/22/20 2201)       CLINICAL IMPRESSION      1.  Abscess of left thigh          DISPOSITION/PLAN   DISPOSITION Decision To Discharge 04/22/2020 09:56:09 PM      PATIENT REFERRED TO:  JACKIE Melo - CNP  [address] [address]  [phone]    Schedule an appointment as soon as possible for a visit in 1 week  For wound re-check      DISCHARGE MEDICATIONS:  Discharge Medication List as of 4/22/2020  9:56 PM      START taking these medications    Details   doxycycline hyclate (VIBRAMYCIN) 100 MG capsule Take 1 capsule by mouth 2 times daily for 7 days, Disp-14 capsule, R-0Normal                  Sangeetha Hargrove MD (electronically signed)  AttendingEmergency Department Provider            Sangeetha Hargrove MD  04/22/20 2495

## 2020-04-23 NOTE — DISCHARGE INSTR - COC
Continuity of Care Form    Patient Name: Mc Lane   :  1985  MRN:  [id number]    Admit date:  2020  Discharge date:  ***    Code Status Order: Prior   Advance Directives:     Admitting Physician:  No admitting provider for patient encounter. PCP: JACKIE Humphreys CNP    Discharging Nurse: [facility] Unit/Room#:   Discharging Unit Phone Number: ***    Emergency Contact:   Extended Emergency Contact Information  Primary Emergency Contact: Aziza Chris  Home Phone: [phone]  Relation: Spouse   needed? No    Past Surgical History:  Past Surgical History:   Procedure Laterality Date    BUNIONECTOMY      x3     SECTION N/A 2020     SECTION performed by Earle [facility]zquita MD at [facility] AT THE [facility] L&D OR    FOOT SURGERY      [facility]  2017       Immunization History: There is no immunization history for the selected administration types on file for this patient.     Active Problems:  Patient Active Problem List   Diagnosis Code    Pregnancy in multigravida Z34.80    Pelvic pain affecting pregnancy in first trimester, antepartum O26.891, R10.2    Abdominal pain R10.9    Vaginal bleeding during pregnancy O46.90    Encounter for induction of labor Z34.90     delivery delivered O82       Isolation/Infection:   Isolation          No Isolation        Patient Infection Status     None to display          Nurse Assessment:  Last Vital Signs: /72   Pulse 56   Temp 97.8 °F (36.6 °C) (Oral)   Resp 16   LMP 2020 (Within Days)   SpO2 99%     Last documented pain score (0-10 scale): Pain Level: 2  Last Weight:   Wt Readings from Last 1 Encounters:   20 154 lb (69.9 kg)     Mental Status:  {IP PT MENTAL STATUS:30376}    IV Access:  [facility] SYMONE IV ACCESS:721621675}    Nursing Mobility/ADLs:  Walking   {CHP DME YFNW:196685321}  Transfer  {CHP DME EHBS:923403165}  Bathing  {CHP DME TTXH:505294892}  Dressing  {CHP DME BFQP:719777609}  Toileting  {CHP DME QPWI:893366700}  Feeding  {CHP DME QCPZ:436373968}  Med Admin  {P DME JNSQ:470011607}  Med Delivery   { SYMONE MED Delivery:307127253}    Wound Care Documentation and Therapy:        Elimination:  Continence:   · Bowel: {YES / SW:15857}  · Bladder: {YES / SZ:88856}  Urinary Catheter: {Urinary Catheter:007721969}   Colostomy/Ileostomy/Ileal Conduit: {YES / IU:10095}       Date of Last BM: ***  No intake or output data in the 24 hours ending 204  No intake/output data recorded.     Safety Concerns:     [address] [employer] Safety Concerns:611937700}    Impairments/Disabilities:      [address] [employer] Impairments/Disabilities:231723023}    Nutrition Therapy:  Current Nutrition Therapy:   [address] [employer] Diet List:402825701}    Routes of Feeding: {[facility] DME Other Feedings:862120771}  Liquids: {Slp liquid thickness:40165}  Daily Fluid Restriction: {P DME Yes amt example:550766064}  Last Modified Barium Swallow with Video (Video Swallowing Test): {Done Not Done IDTE:004450987}    Treatments at the Time of Hospital Discharge:   Respiratory Treatments: ***  Oxygen Therapy:  {Therapy; copd oxygen:17296}  Ventilator:    { CC Vent KOLT:767653147}    Rehab Therapies: {THERAPEUTIC INTERVENTION:1214981837}  Weight Bearing Status/Restrictions: [address]  Weight Bearin}  Other Medical Equipment (for information only, NOT a DME order):  {EQUIPMENT:074655929}  Other Treatments: ***    Patient's personal belongings (please select all that are sent with patient):  {[facility] DME Belongings:103003078}    RN SIGNATURE:  {Esignature:356452211}    CASE MANAGEMENT/SOCIAL WORK SECTION    Inpatient Status Date: ***    Readmission Risk Assessment Score:  Readmission Risk              Risk of Unplanned Readmission:        0           Discharging to Facility/ Agency   · Name:   · Address:  · Phone:  · Fax:    Dialysis Facility (if applicable)   · Name:  · Address:  · Dialysis Schedule:  · Phone:  · Fax:    /Social

## 2020-07-29 ENCOUNTER — HOSPITAL ENCOUNTER (OUTPATIENT)
Dept: ULTRASOUND IMAGING | Age: 35
Discharge: HOME | End: 2020-07-31
Payer: COMMERCIAL

## 2020-07-29 PROCEDURE — 76856 US EXAM PELVIC COMPLETE: CPT

## 2020-08-04 ENCOUNTER — HOSPITAL ENCOUNTER (OUTPATIENT)
Dept: LAB | Age: 35
Setting detail: SPECIMEN
Discharge: HOME | End: 2020-08-04
Payer: COMMERCIAL

## 2020-08-04 DIAGNOSIS — Z01.818 PREOP TESTING: Primary | ICD-10-CM

## 2020-08-04 PROCEDURE — U0003 INFECTIOUS AGENT DETECTION BY NUCLEIC ACID (DNA OR RNA); SEVERE ACUTE RESPIRATORY SYNDROME CORONAVIRUS 2 (SARS-COV-2) (CORONAVIRUS DISEASE [COVID-19]), AMPLIFIED PROBE TECHNIQUE, MAKING USE OF HIGH THROUGHPUT TECHNOLOGIES AS DESCRIBED BY CMS-2020-01-R: HCPCS

## 2020-08-06 LAB — SARS-COV-2, NAA: NOT DETECTED

## 2020-08-07 ENCOUNTER — TELEPHONE (OUTPATIENT)
Dept: PRIMARY CARE CLINIC | Age: 35
End: 2020-08-07

## 2020-09-15 ENCOUNTER — HOSPITAL ENCOUNTER (OUTPATIENT)
Dept: CT IMAGING | Age: 35
Discharge: HOME | End: 2020-09-17
Payer: COMMERCIAL

## 2020-09-15 PROCEDURE — 6360000004 HC RX CONTRAST MEDICATION: Performed by: NURSE PRACTITIONER

## 2020-09-15 PROCEDURE — 74177 CT ABD & PELVIS W/CONTRAST: CPT

## 2020-09-15 RX ADMIN — IOPAMIDOL 75 ML: 755 INJECTION, SOLUTION INTRAVENOUS at 10:26

## 2020-09-15 RX ADMIN — IOPAMIDOL 18 ML: 755 INJECTION, SOLUTION INTRAVENOUS at 10:26

## 2020-09-19 ENCOUNTER — HOSPITAL ENCOUNTER (EMERGENCY)
Age: 35
Discharge: HOME | End: 2020-09-19
Payer: COMMERCIAL

## 2020-09-19 VITALS
DIASTOLIC BLOOD PRESSURE: 56 MMHG | TEMPERATURE: 97.7 F | SYSTOLIC BLOOD PRESSURE: 135 MMHG | OXYGEN SATURATION: 97 % | RESPIRATION RATE: 18 BRPM | HEART RATE: 60 BPM

## 2020-09-19 LAB
DIRECT EXAM: NORMAL
Lab: NORMAL
SPECIMEN DESCRIPTION: NORMAL

## 2020-09-19 PROCEDURE — U0003 INFECTIOUS AGENT DETECTION BY NUCLEIC ACID (DNA OR RNA); SEVERE ACUTE RESPIRATORY SYNDROME CORONAVIRUS 2 (SARS-COV-2) (CORONAVIRUS DISEASE [COVID-19]), AMPLIFIED PROBE TECHNIQUE, MAKING USE OF HIGH THROUGHPUT TECHNOLOGIES AS DESCRIBED BY CMS-2020-01-R: HCPCS

## 2020-09-19 PROCEDURE — 99282 EMERGENCY DEPT VISIT SF MDM: CPT

## 2020-09-19 PROCEDURE — 87880 STREP A ASSAY W/OPTIC: CPT

## 2020-09-19 ASSESSMENT — PAIN DESCRIPTION - DESCRIPTORS: DESCRIPTORS: PRESSURE

## 2020-09-19 ASSESSMENT — PAIN DESCRIPTION - FREQUENCY: FREQUENCY: CONTINUOUS

## 2020-09-19 ASSESSMENT — PAIN DESCRIPTION - PAIN TYPE: TYPE: ACUTE PAIN

## 2020-09-19 ASSESSMENT — PAIN DESCRIPTION - LOCATION: LOCATION: EAR

## 2020-09-19 ASSESSMENT — PAIN SCALES - GENERAL: PAINLEVEL_OUTOF10: 4

## 2020-09-19 ASSESSMENT — PAIN DESCRIPTION - ORIENTATION: ORIENTATION: LEFT

## 2020-09-19 NOTE — ED PROVIDER NOTES
[facility]  EMERGENCY DEPARTMENT ENCOUNTER      Pt Name: Sly Santos  MRN: [id number]  [place] 1985  Date of evaluation: 9/19/2020  Provider: Felicity Alexander, Hattie Azul       Chief Complaint   Patient presents with   Jodell Lapping     left ear pain    Pharyngitis         HISTORY OF PRESENT ILLNESS   (Location/Symptom, Timing/Onset, Context/Setting, Quality, Duration, Modifying Factors, Severity)  Note limiting factors. Sly Santos is a 30 y.o. female who presents to the emergency department for a sore throat and left otalgia. The patient reports the symptoms began gradually, yesterday. Reports that has cold symptoms. Patient reports that the cough is nonproductive. The patient reports that the severity of the illness is mild. Reports fever. The symptoms are continuous in nature and currently present. Her daughter has similar symptoms    Nursing Notes were reviewed. REVIEW OF SYSTEMS    (2-9 systems for level 4, 10 or more for level 5)   Constitutional: see HPI  Skin: Negative for rash, itching  HENT: See HPI  Eyes: Negative for eye pain, eye discharge  Cardiovascular: Negative for chest pain, dyspnea on exertion, palpitations  Respiratory: Negative for cough. No shortness of breath, wheezing or stridor. Gastrointestinal: Negative for heartburn, nausea, vomiting, abdominal pain  Musculoskeletal: Negative for myalgias, back pain, joint pain and falls. Neurological: Negative for dizziness, tingling, sensory change, speech change  Allergy/Immunology: Negative for environmental allergies and urticaria. Except as noted above the remainder of the review of systems was reviewed and negative.        PAST MEDICAL HISTORY     Past Medical History:   Diagnosis Date    Abnormal Pap smear of cervix     leep 2016    Cervical cancer ([facility] [address]     Heart murmur     Mitral valve regurgitation     Thyroid disease          SURGICAL HISTORY       Past Surgical History:   Procedure Laterality Date    BUNIONECTOMY      x3     SECTION N/A 2020     SECTION [facility] OR    FOOT SURGERY      [facility]  2017         CURRENT MEDICATIONS       Current Discharge Medication List      CONTINUE these medications which have NOT CHANGED    Details   loratadine (CLARITIN) 10 MG tablet Take 10 mg by mouth daily      ibuprofen (ADVIL;MOTRIN) 800 MG tablet Take 1 tablet by mouth every 8 hours  Qty: 120 tablet, Refills: 3      docusate sodium (COLACE, DULCOLAX) 100 MG CAPS Take 100 mg by mouth 2 times daily  Qty: 8 capsule, Refills: 0             ALLERGIES     Bactrim [sulfamethoxazole-trimethoprim]    FAMILY HISTORY       Family History   Problem Relation Age of Onset    Colon Cancer Maternal Grandmother     Hypertension Maternal Grandmother     Diabetes Maternal Grandmother     Lung Cancer Maternal Grandfather     Heart Disease Maternal Grandfather     Esophageal Cancer Maternal Grandfather     Cervical Cancer Sister     Cancer Maternal Grandfather         lung ca          SOCIAL HISTORY       Social History     Socioeconomic History    Marital status:      Spouse name: None    Number of children: None    Years of education: None    Highest education level: None   Occupational History    None   Social Needs    Financial resource strain: None    Food insecurity     Worry: None     Inability: None    Transportation needs     Medical: None     Non-medical: None   Tobacco Use    Smoking status: Never Smoker    Smokeless tobacco: Never Used   Substance and Sexual Activity    Alcohol use: Not Currently     Frequency: Monthly or less     Binge frequency: Never     Comment: socially mostly holidays    Drug use: Never    Sexual activity: Yes     Partners: Male     Birth control/protection: I.U.D.    Lifestyle    Physical activity     Days per week: None     Minutes per session: None    Stress: None   Relationships    Social connections Talks on phone: None     Gets together: None     Attends Christianity service: None     Active member of club or organization: None     Attends meetings of clubs or organizations: None     Relationship status: None    Intimate partner violence     Fear of current or ex partner: None     Emotionally abused: None     Physically abused: None     Forced sexual activity: None   Other Topics Concern    None   Social History Narrative    ** Merged History Encounter **            SCREENINGS                PHYSICAL EXAM    (up to 7 for level 4, 8 or more for level 5)     ED Triage Vitals [09/19/20 1828]   BP Temp Temp Source Pulse Resp SpO2 Height Weight   (!) 135/56 97.7 °F (36.5 °C) Temporal 60 18 97 % -- --     Constitutional: Oriented and well-developed, well-nourished, and in no distress. Non-toxic appearance. HEENT:   Head: Normocephalic and atraumatic. Right Ear: Tympanic membrane, external ear and ear canal normal.   Left Ear: Tympanic membrane, external ear and ear canal normal.   Nose: Rhinorrhea present with clear nasal discharge  Mouth/Throat: Uvula is midline, oropharynx is clear and moist and mucous membranes are normal. No oral lesions. No oropharyngeal exudate or posterior oropharyngeal erythema. No asymmetry or fullness. No stridor. Eyes: Extra ocular muscles intact. . Pupils are equal, round, and reactive to light. No discharge. No scleral icterus. Neck: Normal range of motion and phonation normal. Neck supple. No spinous process tenderness and no muscular tenderness present. No thyromegaly present. No cervical adenopathy. Cardiovascular: Regular rate and rhythm, normal heart sounds and intact distal pulses. No murmur heard. Pulmonary/Chest: Effort normal and breath sounds normal. No accessory muscle usage or stridor. Not tachypneic. No respiratory distress. No tenderness and no retraction. Abdominal: Soft and non-distended. Bowel sounds are normal. No masses or organomegaly.  No significant tenderness. No rebound, no guarding and no CVA tenderness. No appreciable hernia. Musculoskeletal: Normal range of motion. No edema and no tenderness. Neurological: Alert and oriented. Skin: Skin is warm and dry. No rash noted. No cyanosis or erythema. No pallor. Nails show no clubbing. DIAGNOSTIC RESULTS     EKG: All EKG's are interpreted by the Emergency Department Physician who either signs or Co-signs this chart in the absence of a cardiologist.      RADIOLOGY:   Non-plain film images such as CT, Ultrasound and MRI are read by the radiologist. Plain radiographic images are visualized and preliminarily interpreted by the emergency physician with the below findings:    Interpretation per the Radiologist below, if available at the time of this note:    No orders to display         ED BEDSIDE ULTRASOUND:   Performed by ED Physician - none    LABS:  Results for orders placed or performed during the hospital encounter of 09/19/20   Strep Screen Group A Throat    Specimen: Throat   Result Value Ref Range    Specimen Description . THROAT     Special Requests NOT REPORTED     Direct Exam       Rapid Strep A negative. A negative Rapid Group A Strep Screen result does not rule out the possibility of Group A Streptococci in the specimen. A Group A Strep DNA test is available upon request.     Orders Placed This Encounter   Procedures    Strep Screen Group A Throat    COVID-19, PCR       All other labs were within normal range or not returned as of this dictation. EMERGENCY DEPARTMENT COURSE and DIFFERENTIAL DIAGNOSIS/MDM:   Vitals:    Vitals:    09/19/20 1828   BP: (!) 135/56   Pulse: 60   Resp: 18   Temp: 97.7 °F (36.5 °C)   TempSrc: Temporal   SpO2: 97%       MEDICAL DECISION MAKING:  Patient was swabbed for COVID. She was diagnosed with a viral upper respiratory tract infection.     The patient was evaluated during the global COVID-19 pandemic, and that diagnosis was considered upon their initial presentation. Their evaluation, treatment and testing was consistent with current guidelines for patients who present with complaints or symptoms that may be related to COVID-19. Full PPE including gloves, gown, N95 or P100 respirator, and eye protection was used during every encounter with this patient. FINAL IMPRESSION      1. Viral upper respiratory tract infection Stable         DISPOSITION/PLAN   DISPOSITION Decision To Discharge 09/19/2020 07:34:32 PM      PATIENT REFERRED TO:  JACKIE Junior CNP  [address] [place], [place] [address] [phone]    Schedule an appointment as soon as possible for a visit in 3 days        DISCHARGE MEDICATIONS:  Current Discharge Medication List        Controlled Substances Monitoring:     No flowsheet data found.     (Please note that portions of this note were completed with a voice recognition program.  Efforts were made to edit the dictations but occasionally words are mis-transcribed.)    Electronically signed by JACKIE Rubi CNP on 9/19/2020 at 7:35 PM       JACKIE Rubi CNP  09/19/20 1936

## 2020-09-21 LAB
SARS-COV-2, RAPID: NORMAL
SARS-COV-2: NORMAL
SARS-COV-2: NOT DETECTED
SOURCE: NORMAL

## 2020-10-12 ENCOUNTER — HOSPITAL ENCOUNTER (OUTPATIENT)
Age: 35
Discharge: HOME | End: 2020-10-12
Payer: COMMERCIAL

## 2020-10-12 LAB
-: ABNORMAL
ABSOLUTE EOS #: 0.05 K/UL (ref 0–0.44)
ABSOLUTE IMMATURE GRANULOCYTE: <0.03 K/UL (ref 0–0.3)
ABSOLUTE LYMPH #: 1.82 K/UL (ref 1.1–3.7)
ABSOLUTE MONO #: 0.49 K/UL (ref 0.1–1.2)
AMORPHOUS: ABNORMAL
BACTERIA: ABNORMAL
BASOPHILS # BLD: 1 % (ref 0–2)
BASOPHILS ABSOLUTE: 0.03 K/UL (ref 0–0.2)
BILIRUBIN URINE: NEGATIVE
CASTS UA: ABNORMAL /LPF
COLOR: YELLOW
COMMENT UA: ABNORMAL
CRYSTALS, UA: ABNORMAL /HPF
DIFFERENTIAL TYPE: ABNORMAL
EOSINOPHILS RELATIVE PERCENT: 1 % (ref 1–4)
EPITHELIAL CELLS UA: ABNORMAL /HPF (ref 0–25)
FOLLICLE STIMULATING HORMONE: 2.7 U/L (ref 1.7–21.5)
GLUCOSE URINE: NEGATIVE
HCT VFR BLD CALC: 36.2 % (ref 36.3–47.1)
HEMOGLOBIN: 11.5 G/DL (ref 11.9–15.1)
HIGH SENSITIVE C-REACTIVE PROTEIN: 0.7 MG/L
IMMATURE GRANULOCYTES: 0 %
KETONES, URINE: NEGATIVE
LEUKOCYTE ESTERASE, URINE: NEGATIVE
LYMPHOCYTES # BLD: 32 % (ref 24–43)
MCH RBC QN AUTO: 28.6 PG (ref 25.2–33.5)
MCHC RBC AUTO-ENTMCNC: 31.8 G/DL (ref 28.4–34.8)
MCV RBC AUTO: 90 FL (ref 82.6–102.9)
MONOCYTES # BLD: 9 % (ref 3–12)
MUCUS: ABNORMAL
NITRITE, URINE: NEGATIVE
NRBC AUTOMATED: 0 PER 100 WBC
OTHER OBSERVATIONS UA: ABNORMAL
PDW BLD-RTO: 13.4 % (ref 11.8–14.4)
PH UA: 6 (ref 5–9)
PLATELET # BLD: 199 K/UL (ref 138–453)
PLATELET ESTIMATE: ABNORMAL
PMV BLD AUTO: 11.5 FL (ref 8.1–13.5)
PROTEIN UA: NEGATIVE
RBC # BLD: 4.02 M/UL (ref 3.95–5.11)
RBC # BLD: ABNORMAL 10*6/UL
RBC UA: ABNORMAL /HPF (ref 0–2)
RENAL EPITHELIAL, UA: ABNORMAL /HPF
SEDIMENTATION RATE, ERYTHROCYTE: 10 MM (ref 0–20)
SEG NEUTROPHILS: 57 % (ref 36–65)
SEGMENTED NEUTROPHILS ABSOLUTE COUNT: 3.37 K/UL (ref 1.5–8.1)
SPECIFIC GRAVITY UA: >1.03 (ref 1.01–1.02)
TRICHOMONAS: ABNORMAL
TURBIDITY: CLEAR
URINE HGB: NEGATIVE
UROBILINOGEN, URINE: NORMAL
WBC # BLD: 5.8 K/UL (ref 3.5–11.3)
WBC # BLD: ABNORMAL 10*3/UL
WBC UA: ABNORMAL /HPF (ref 0–5)
YEAST: ABNORMAL

## 2020-10-12 PROCEDURE — 86141 C-REACTIVE PROTEIN HS: CPT

## 2020-10-12 PROCEDURE — 81001 URINALYSIS AUTO W/SCOPE: CPT

## 2020-10-12 PROCEDURE — 85651 RBC SED RATE NONAUTOMATED: CPT

## 2020-10-12 PROCEDURE — 83001 ASSAY OF GONADOTROPIN (FSH): CPT

## 2020-10-12 PROCEDURE — 36415 COLL VENOUS BLD VENIPUNCTURE: CPT

## 2020-10-12 PROCEDURE — 85025 COMPLETE CBC W/AUTO DIFF WBC: CPT

## 2020-10-12 PROCEDURE — 84146 ASSAY OF PROLACTIN: CPT

## 2020-10-13 LAB — PROLACTIN: 5.4 UG/L (ref 4.79–23.3)

## 2020-12-03 ENCOUNTER — HOSPITAL ENCOUNTER (OUTPATIENT)
Dept: PREADMISSION TESTING | Age: 35
Setting detail: SPECIMEN
Discharge: HOME | End: 2020-12-07
Payer: COMMERCIAL

## 2020-12-03 ENCOUNTER — HOSPITAL ENCOUNTER (OUTPATIENT)
Dept: PREADMISSION TESTING | Age: 35
Discharge: HOME | End: 2020-12-07
Payer: COMMERCIAL

## 2020-12-03 VITALS
SYSTOLIC BLOOD PRESSURE: 136 MMHG | TEMPERATURE: 97.6 F | DIASTOLIC BLOOD PRESSURE: 62 MMHG | WEIGHT: 145.3 LBS | RESPIRATION RATE: 17 BRPM | HEIGHT: 66 IN | HEART RATE: 73 BPM | BODY MASS INDEX: 23.35 KG/M2 | OXYGEN SATURATION: 98 %

## 2020-12-03 LAB
ABSOLUTE EOS #: 0.05 K/UL (ref 0–0.44)
ABSOLUTE IMMATURE GRANULOCYTE: <0.03 K/UL (ref 0–0.3)
ABSOLUTE LYMPH #: 1.84 K/UL (ref 1.1–3.7)
ABSOLUTE MONO #: 0.34 K/UL (ref 0.1–1.2)
ANION GAP SERPL CALCULATED.3IONS-SCNC: 6 MMOL/L (ref 9–17)
BASOPHILS # BLD: 1 % (ref 0–2)
BASOPHILS ABSOLUTE: 0.03 K/UL (ref 0–0.2)
BUN BLDV-MCNC: 10 MG/DL (ref 6–20)
BUN/CREAT BLD: 17 (ref 9–20)
CALCIUM SERPL-MCNC: 9.4 MG/DL (ref 8.6–10.4)
CHLORIDE BLD-SCNC: 104 MMOL/L (ref 98–107)
CO2: 29 MMOL/L (ref 20–31)
CREAT SERPL-MCNC: 0.58 MG/DL (ref 0.5–0.9)
DIFFERENTIAL TYPE: NORMAL
EKG ATRIAL RATE: 48 BPM
EKG P AXIS: 59 DEGREES
EKG P-R INTERVAL: 116 MS
EKG Q-T INTERVAL: 460 MS
EKG QRS DURATION: 78 MS
EKG QTC CALCULATION (BAZETT): 410 MS
EKG R AXIS: 26 DEGREES
EKG T AXIS: 41 DEGREES
EKG VENTRICULAR RATE: 48 BPM
EOSINOPHILS RELATIVE PERCENT: 1 % (ref 1–4)
GFR AFRICAN AMERICAN: >60 ML/MIN
GFR NON-AFRICAN AMERICAN: >60 ML/MIN
GFR SERPL CREATININE-BSD FRML MDRD: ABNORMAL ML/MIN/{1.73_M2}
GFR SERPL CREATININE-BSD FRML MDRD: ABNORMAL ML/MIN/{1.73_M2}
GLUCOSE BLD-MCNC: 91 MG/DL (ref 70–99)
HCG QUALITATIVE: NEGATIVE
HCT VFR BLD CALC: 38.2 % (ref 36.3–47.1)
HEMOGLOBIN: 12.1 G/DL (ref 11.9–15.1)
IMMATURE GRANULOCYTES: 0 %
LYMPHOCYTES # BLD: 33 % (ref 24–43)
MCH RBC QN AUTO: 28.3 PG (ref 25.2–33.5)
MCHC RBC AUTO-ENTMCNC: 31.7 G/DL (ref 28.4–34.8)
MCV RBC AUTO: 89.5 FL (ref 82.6–102.9)
MONOCYTES # BLD: 6 % (ref 3–12)
NRBC AUTOMATED: 0 PER 100 WBC
PDW BLD-RTO: 13.2 % (ref 11.8–14.4)
PLATELET # BLD: 194 K/UL (ref 138–453)
PLATELET ESTIMATE: NORMAL
PMV BLD AUTO: 11.9 FL (ref 8.1–13.5)
POTASSIUM SERPL-SCNC: 4 MMOL/L (ref 3.7–5.3)
RBC # BLD: 4.27 M/UL (ref 3.95–5.11)
RBC # BLD: NORMAL 10*6/UL
SEG NEUTROPHILS: 59 % (ref 36–65)
SEGMENTED NEUTROPHILS ABSOLUTE COUNT: 3.34 K/UL (ref 1.5–8.1)
SODIUM BLD-SCNC: 139 MMOL/L (ref 135–144)
WBC # BLD: 5.6 K/UL (ref 3.5–11.3)
WBC # BLD: NORMAL 10*3/UL

## 2020-12-03 PROCEDURE — 93005 ELECTROCARDIOGRAM TRACING: CPT | Performed by: OBSTETRICS & GYNECOLOGY

## 2020-12-03 PROCEDURE — C9803 HOPD COVID-19 SPEC COLLECT: HCPCS

## 2020-12-03 PROCEDURE — 36415 COLL VENOUS BLD VENIPUNCTURE: CPT

## 2020-12-03 PROCEDURE — 80048 BASIC METABOLIC PNL TOTAL CA: CPT

## 2020-12-03 PROCEDURE — 93010 ELECTROCARDIOGRAM REPORT: CPT | Performed by: INTERNAL MEDICINE

## 2020-12-03 PROCEDURE — 84703 CHORIONIC GONADOTROPIN ASSAY: CPT

## 2020-12-03 PROCEDURE — 85025 COMPLETE CBC W/AUTO DIFF WBC: CPT

## 2020-12-03 PROCEDURE — U0003 INFECTIOUS AGENT DETECTION BY NUCLEIC ACID (DNA OR RNA); SEVERE ACUTE RESPIRATORY SYNDROME CORONAVIRUS 2 (SARS-COV-2) (CORONAVIRUS DISEASE [COVID-19]), AMPLIFIED PROBE TECHNIQUE, MAKING USE OF HIGH THROUGHPUT TECHNOLOGIES AS DESCRIBED BY CMS-2020-01-R: HCPCS

## 2020-12-03 RX ORDER — SODIUM CHLORIDE, SODIUM LACTATE, POTASSIUM CHLORIDE, CALCIUM CHLORIDE 600; 310; 30; 20 MG/100ML; MG/100ML; MG/100ML; MG/100ML
INJECTION, SOLUTION INTRAVENOUS CONTINUOUS
Status: CANCELLED | OUTPATIENT
Start: 2020-12-03

## 2020-12-03 NOTE — PROGRESS NOTES
[facility]   Preadmission Testing    Name: Juliann Harris  : 1985  Patient Phone: [phone] (home) [phone] (work)    Procedure JIM  Date of Procedure: 2020  Surgeon: No att. providers found    Ht:  5'6\"  Wt: 145.3lb  Wt method: actual    Allergies: Allergies   Allergen Reactions    Bactrim [Sulfamethoxazole-Trimethoprim] Hives       Peanut allergy: No    Latex Allergy Screening Tool  Have you ever had a reaction to or been told by a physician that you have an allergy to latex or natural rubber?: No    There were no vitals filed for this visit. Patient's last menstrual period was 2020. Do you take blood thinners? [] Yes    [x] No         Instructed to stop blood thinners prior to procedure? [x] Yes    [] No      [] N/A   Do you have sleep apnea? [] Yes    [x] No     Instructed to bring CPAP machine? [] Yes    [] No    [x] N/A   Do you have acid reflux ? [] Yes    [x] No     Do you have  hiatal hernia? [] Yes    [x] No    Do you ever experience motion sickness? [] Yes    [x] No     Have you had a respiratory infection or sore throat in last 4 weeks before surgery? [] Yes    [x] No     Do you have poorly controlled asthma or COPD? [] Yes    [x] No     Do you have a history of angina in the last month or symptomatic arrhythmia? [] Yes    [x] No     Do you have significant central nervous system disease? [] Yes    [x] No     Have you had an EKG, labs, or chest xray in last 12 months? If yes provide copies to anesthesia   [x] Yes    [] No       [x] Lab    [] EKG    [] CXR     Have you had a stress test?     [x] Yes    [] No    When/where: [place]    Was it normal?    [x] Yes    [] No   Do you or your family have a history of Malignant Hyperthermia?    [] Yes    [x] No               PAT Call/Visit Questions  Person Interviewed: patient  Relationship to Patient: self  Surgery Time Verified: Yes  Surgery Location Verified: Yes  Patient Language: English  Medical History Reviewed: Yes  NPO Status Reinforced: Yes  Ride and Caregiver Arranged: Yes  Ride Caregiver Provider: Debora Alston --   Patient Knows to Bring Current Medications: Yes    Pre-AdmissionTesting Checklist  Patient has been to this health system before?: Yes, W/in last 6 months  Does patient refuse blood?: No  Pre-existing DNR Comfort Care/DNR Arrest/DNI Order: No  Healthcare Directive: No, patient does not have an advance directive for healthcare treatment   needed: No  Patient can read and write?: Yes  Meds-to-Beds: Does the patient want to have any new prescriptions delivered to bedside prior to discharge?: No  History given by: Patient  Providing self care at home?: Yes  Discharge transport (for same day patients): Family    Patient instructed on the pre-operative, intra-operative, and post-operative process? Yes  Medication instructions reviewed with patient? Yes  Pre operative instruction sheet reviewed and given to patient in PAT?   Yes

## 2020-12-04 LAB
SARS-COV-2, RAPID: NORMAL
SARS-COV-2: NORMAL
SARS-COV-2: NOT DETECTED
SOURCE: NORMAL

## 2021-02-18 ENCOUNTER — HOSPITAL ENCOUNTER (OUTPATIENT)
Age: 36
Discharge: HOME | End: 2021-02-18
Payer: COMMERCIAL

## 2021-02-18 ENCOUNTER — HOSPITAL ENCOUNTER (OUTPATIENT)
Dept: ULTRASOUND IMAGING | Age: 36
Discharge: HOME | End: 2021-02-20
Payer: COMMERCIAL

## 2021-02-18 DIAGNOSIS — E01.0 THYROMEGALY: ICD-10-CM

## 2021-02-18 LAB — TSH SERPL DL<=0.05 MIU/L-ACNC: 4.29 MIU/L (ref 0.3–5)

## 2021-02-18 PROCEDURE — 84443 ASSAY THYROID STIM HORMONE: CPT

## 2021-02-18 PROCEDURE — 76536 US EXAM OF HEAD AND NECK: CPT

## 2021-02-18 PROCEDURE — 36415 COLL VENOUS BLD VENIPUNCTURE: CPT

## 2021-06-15 ENCOUNTER — HOSPITAL ENCOUNTER (OUTPATIENT)
Dept: ULTRASOUND IMAGING | Age: 36
Discharge: HOME | End: 2021-06-17
Payer: COMMERCIAL

## 2021-06-15 DIAGNOSIS — R10.32 LLQ PAIN: ICD-10-CM

## 2021-06-15 DIAGNOSIS — R10.32 LEFT LOWER QUADRANT PAIN: ICD-10-CM

## 2021-06-15 DIAGNOSIS — N94.6 DYSMENORRHEA, UNSPECIFIED: ICD-10-CM

## 2021-06-15 PROCEDURE — 93976 VASCULAR STUDY: CPT

## 2021-06-15 PROCEDURE — 76830 TRANSVAGINAL US NON-OB: CPT

## 2022-03-11 ENCOUNTER — HOSPITAL ENCOUNTER (EMERGENCY)
Age: 37
Discharge: HOME | End: 2022-03-11
Payer: COMMERCIAL

## 2022-03-11 ENCOUNTER — APPOINTMENT (OUTPATIENT)
Dept: CT IMAGING | Age: 37
End: 2022-03-11
Payer: COMMERCIAL

## 2022-03-11 VITALS
SYSTOLIC BLOOD PRESSURE: 118 MMHG | RESPIRATION RATE: 19 BRPM | HEART RATE: 53 BPM | OXYGEN SATURATION: 97 % | DIASTOLIC BLOOD PRESSURE: 77 MMHG | TEMPERATURE: 98 F

## 2022-03-11 DIAGNOSIS — S39.011A STRAIN OF ABDOMINAL WALL, INITIAL ENCOUNTER: Primary | ICD-10-CM

## 2022-03-11 LAB
-: NORMAL
ABSOLUTE EOS #: 0.06 K/UL (ref 0–0.44)
ABSOLUTE IMMATURE GRANULOCYTE: <0.03 K/UL (ref 0–0.3)
ABSOLUTE LYMPH #: 2.29 K/UL (ref 1.1–3.7)
ABSOLUTE MONO #: 0.29 K/UL (ref 0.1–1.2)
ALBUMIN SERPL-MCNC: 4.9 G/DL (ref 3.5–5.2)
ALBUMIN/GLOBULIN RATIO: 1.7 (ref 1–2.5)
ALP BLD-CCNC: 92 U/L (ref 35–104)
ALT SERPL-CCNC: 14 U/L (ref 5–33)
ANION GAP SERPL CALCULATED.3IONS-SCNC: 8 MMOL/L (ref 9–17)
AST SERPL-CCNC: 20 U/L
BASOPHILS # BLD: 1 % (ref 0–2)
BASOPHILS ABSOLUTE: 0.04 K/UL (ref 0–0.2)
BILIRUB SERPL-MCNC: 0.29 MG/DL (ref 0.3–1.2)
BILIRUBIN DIRECT: <0.08 MG/DL
BILIRUBIN URINE: NEGATIVE
BILIRUBIN, INDIRECT: ABNORMAL MG/DL (ref 0–1)
BUN BLDV-MCNC: 19 MG/DL (ref 6–20)
BUN/CREAT BLD: 28 (ref 9–20)
C-REACTIVE PROTEIN: <3 MG/L (ref 0–5)
CALCIUM SERPL-MCNC: 9.6 MG/DL (ref 8.6–10.4)
CHLORIDE BLD-SCNC: 101 MMOL/L (ref 98–107)
CO2: 29 MMOL/L (ref 20–31)
COLOR: YELLOW
CREAT SERPL-MCNC: 0.67 MG/DL (ref 0.5–0.9)
DIRECT EXAM: NORMAL
EOSINOPHILS RELATIVE PERCENT: 1 % (ref 1–4)
EPITHELIAL CELLS UA: NORMAL /HPF (ref 0–25)
GFR AFRICAN AMERICAN: >60 ML/MIN
GFR NON-AFRICAN AMERICAN: >60 ML/MIN
GFR SERPL CREATININE-BSD FRML MDRD: ABNORMAL ML/MIN/{1.73_M2}
GFR SERPL CREATININE-BSD FRML MDRD: ABNORMAL ML/MIN/{1.73_M2}
GLUCOSE BLD-MCNC: 91 MG/DL (ref 70–99)
GLUCOSE URINE: NEGATIVE
HCG(URINE) PREGNANCY TEST: NEGATIVE
HCT VFR BLD CALC: 40.4 % (ref 36.3–47.1)
HEMOGLOBIN: 13.3 G/DL (ref 11.9–15.1)
IMMATURE GRANULOCYTES: 0 %
KETONES, URINE: NEGATIVE
LEUKOCYTE ESTERASE, URINE: NEGATIVE
LIPASE: 48 U/L (ref 13–60)
LYMPHOCYTES # BLD: 42 % (ref 24–43)
MCH RBC QN AUTO: 30.9 PG (ref 25.2–33.5)
MCHC RBC AUTO-ENTMCNC: 32.9 G/DL (ref 28.4–34.8)
MCV RBC AUTO: 93.7 FL (ref 82.6–102.9)
MONOCYTES # BLD: 5 % (ref 3–12)
NITRITE, URINE: NEGATIVE
NRBC AUTOMATED: 0 PER 100 WBC
PDW BLD-RTO: 11.7 % (ref 11.8–14.4)
PH UA: 7 (ref 5–9)
PLATELET # BLD: 195 K/UL (ref 138–453)
PMV BLD AUTO: 10.9 FL (ref 8.1–13.5)
POTASSIUM SERPL-SCNC: 3.7 MMOL/L (ref 3.7–5.3)
PROTEIN UA: NEGATIVE
RBC # BLD: 4.31 M/UL (ref 3.95–5.11)
RBC UA: NORMAL /HPF (ref 0–2)
SARS-COV-2, RAPID: NOT DETECTED
SEDIMENTATION RATE, ERYTHROCYTE: 4 MM (ref 0–20)
SEG NEUTROPHILS: 51 % (ref 36–65)
SEGMENTED NEUTROPHILS ABSOLUTE COUNT: 2.8 K/UL (ref 1.5–8.1)
SODIUM BLD-SCNC: 138 MMOL/L (ref 135–144)
SPECIFIC GRAVITY UA: 1.01 (ref 1.01–1.02)
SPECIMEN DESCRIPTION: NORMAL
SPECIMEN DESCRIPTION: NORMAL
TOTAL PROTEIN: 7.8 G/DL (ref 6.4–8.3)
TURBIDITY: CLEAR
URINE HGB: NEGATIVE
UROBILINOGEN, URINE: NORMAL
WBC # BLD: 5.5 K/UL (ref 3.5–11.3)
WBC UA: NORMAL /HPF (ref 0–5)

## 2022-03-11 PROCEDURE — 2580000003 HC RX 258: Performed by: NURSE PRACTITIONER

## 2022-03-11 PROCEDURE — 86140 C-REACTIVE PROTEIN: CPT

## 2022-03-11 PROCEDURE — 80048 BASIC METABOLIC PNL TOTAL CA: CPT

## 2022-03-11 PROCEDURE — 81001 URINALYSIS AUTO W/SCOPE: CPT

## 2022-03-11 PROCEDURE — 6360000004 HC RX CONTRAST MEDICATION: Performed by: NURSE PRACTITIONER

## 2022-03-11 PROCEDURE — 83690 ASSAY OF LIPASE: CPT

## 2022-03-11 PROCEDURE — 87880 STREP A ASSAY W/OPTIC: CPT

## 2022-03-11 PROCEDURE — 6360000002 HC RX W HCPCS: Performed by: NURSE PRACTITIONER

## 2022-03-11 PROCEDURE — 96374 THER/PROPH/DIAG INJ IV PUSH: CPT

## 2022-03-11 PROCEDURE — 80076 HEPATIC FUNCTION PANEL: CPT

## 2022-03-11 PROCEDURE — 74177 CT ABD & PELVIS W/CONTRAST: CPT

## 2022-03-11 PROCEDURE — 87635 SARS-COV-2 COVID-19 AMP PRB: CPT

## 2022-03-11 PROCEDURE — 81025 URINE PREGNANCY TEST: CPT

## 2022-03-11 PROCEDURE — 85652 RBC SED RATE AUTOMATED: CPT

## 2022-03-11 PROCEDURE — 99283 EMERGENCY DEPT VISIT LOW MDM: CPT

## 2022-03-11 PROCEDURE — 85025 COMPLETE CBC W/AUTO DIFF WBC: CPT

## 2022-03-11 PROCEDURE — 36415 COLL VENOUS BLD VENIPUNCTURE: CPT

## 2022-03-11 RX ORDER — LEVOTHYROXINE SODIUM 0.05 MG/1
50 TABLET ORAL DAILY
COMMUNITY

## 2022-03-11 RX ORDER — 0.9 % SODIUM CHLORIDE 0.9 %
1000 INTRAVENOUS SOLUTION INTRAVENOUS ONCE
Status: COMPLETED | OUTPATIENT
Start: 2022-03-11 | End: 2022-03-11

## 2022-03-11 RX ORDER — IBUPROFEN 600 MG/1
600 TABLET ORAL EVERY 6 HOURS PRN
Qty: 20 TABLET | Refills: 0 | Status: SHIPPED | OUTPATIENT
Start: 2022-03-11

## 2022-03-11 RX ORDER — KETOROLAC TROMETHAMINE 15 MG/ML
30 INJECTION, SOLUTION INTRAMUSCULAR; INTRAVENOUS ONCE
Status: COMPLETED | OUTPATIENT
Start: 2022-03-11 | End: 2022-03-11

## 2022-03-11 RX ADMIN — SODIUM CHLORIDE 1000 ML: 9 INJECTION, SOLUTION INTRAVENOUS at 19:25

## 2022-03-11 RX ADMIN — KETOROLAC TROMETHAMINE 30 MG: 15 INJECTION, SOLUTION INTRAMUSCULAR; INTRAVENOUS at 21:05

## 2022-03-11 RX ADMIN — IOPAMIDOL 75 ML: 755 INJECTION, SOLUTION INTRAVENOUS at 20:01

## 2022-03-11 ASSESSMENT — PAIN - FUNCTIONAL ASSESSMENT: PAIN_FUNCTIONAL_ASSESSMENT: 0-10

## 2022-03-11 ASSESSMENT — PAIN DESCRIPTION - DESCRIPTORS: DESCRIPTORS: BURNING;SHARP

## 2022-03-11 ASSESSMENT — PAIN DESCRIPTION - ORIENTATION: ORIENTATION: RIGHT

## 2022-03-11 ASSESSMENT — PAIN DESCRIPTION - LOCATION: LOCATION: ABDOMEN;FLANK

## 2022-03-11 ASSESSMENT — PAIN SCALES - GENERAL
PAINLEVEL_OUTOF10: 9
PAINLEVEL_OUTOF10: 8

## 2022-03-11 NOTE — Clinical Note
Kiah Higuera was seen and treated in our emergency department on 3/11/2022. She may return to work on 03/14/2022. If you have any questions or concerns, please don't hesitate to call.       Andree Soriano, APRN - CNP

## 2022-04-04 ENCOUNTER — HOSPITAL ENCOUNTER (EMERGENCY)
Age: 37
Discharge: HOME | End: 2022-04-04
Attending: EMERGENCY MEDICINE
Payer: COMMERCIAL

## 2022-04-04 VITALS
RESPIRATION RATE: 16 BRPM | OXYGEN SATURATION: 98 % | DIASTOLIC BLOOD PRESSURE: 74 MMHG | HEART RATE: 75 BPM | TEMPERATURE: 98.9 F | SYSTOLIC BLOOD PRESSURE: 144 MMHG

## 2022-04-04 DIAGNOSIS — J06.9 UPPER RESPIRATORY TRACT INFECTION, UNSPECIFIED TYPE: Primary | ICD-10-CM

## 2022-04-04 LAB
FLU A ANTIGEN: NEGATIVE
FLU B ANTIGEN: NEGATIVE
S PYO AG THROAT QL: NEGATIVE
SARS-COV-2, RAPID: NOT DETECTED
SOURCE: NORMAL
SPECIMEN DESCRIPTION: NORMAL

## 2022-04-04 PROCEDURE — 87635 SARS-COV-2 COVID-19 AMP PRB: CPT

## 2022-04-04 PROCEDURE — 99283 EMERGENCY DEPT VISIT LOW MDM: CPT

## 2022-04-04 PROCEDURE — 87804 INFLUENZA ASSAY W/OPTIC: CPT

## 2022-04-04 PROCEDURE — 6370000000 HC RX 637 (ALT 250 FOR IP): Performed by: EMERGENCY MEDICINE

## 2022-04-04 PROCEDURE — C9803 HOPD COVID-19 SPEC COLLECT: HCPCS

## 2022-04-04 PROCEDURE — 87880 STREP A ASSAY W/OPTIC: CPT

## 2022-04-04 RX ORDER — 0.9 % SODIUM CHLORIDE 0.9 %
1000 INTRAVENOUS SOLUTION INTRAVENOUS ONCE
Status: DISCONTINUED | OUTPATIENT
Start: 2022-04-04 | End: 2022-04-05 | Stop reason: HOSPADM

## 2022-04-04 RX ORDER — ONDANSETRON 4 MG/1
4 TABLET, ORALLY DISINTEGRATING ORAL ONCE
Status: COMPLETED | OUTPATIENT
Start: 2022-04-04 | End: 2022-04-04

## 2022-04-04 RX ORDER — ONDANSETRON 2 MG/ML
4 INJECTION INTRAMUSCULAR; INTRAVENOUS ONCE
Status: DISCONTINUED | OUTPATIENT
Start: 2022-04-04 | End: 2022-04-05 | Stop reason: HOSPADM

## 2022-04-04 RX ORDER — ACETAMINOPHEN 500 MG
1000 TABLET ORAL ONCE
Status: COMPLETED | OUTPATIENT
Start: 2022-04-04 | End: 2022-04-04

## 2022-04-04 RX ORDER — ONDANSETRON 4 MG/1
4 TABLET, ORALLY DISINTEGRATING ORAL EVERY 8 HOURS PRN
Qty: 12 TABLET | Refills: 0 | Status: SHIPPED | OUTPATIENT
Start: 2022-04-04

## 2022-04-04 RX ADMIN — ONDANSETRON 4 MG: 4 TABLET, ORALLY DISINTEGRATING ORAL at 22:44

## 2022-04-04 RX ADMIN — ACETAMINOPHEN 1000 MG: 500 TABLET, FILM COATED ORAL at 22:44

## 2022-04-04 ASSESSMENT — PAIN - FUNCTIONAL ASSESSMENT: PAIN_FUNCTIONAL_ASSESSMENT: 0-10

## 2022-04-04 ASSESSMENT — ENCOUNTER SYMPTOMS
SORE THROAT: 1
VOMITING: 1
NAUSEA: 1
TROUBLE SWALLOWING: 0
ABDOMINAL PAIN: 0
SHORTNESS OF BREATH: 0
COUGH: 0
DIARRHEA: 1

## 2022-04-04 ASSESSMENT — PAIN SCALES - GENERAL
PAINLEVEL_OUTOF10: 5
PAINLEVEL_OUTOF10: 7

## 2022-04-04 ASSESSMENT — PAIN DESCRIPTION - PAIN TYPE: TYPE: ACUTE PAIN

## 2022-04-04 ASSESSMENT — PAIN DESCRIPTION - LOCATION: LOCATION: HEAD

## 2022-04-04 ASSESSMENT — PAIN DESCRIPTION - DESCRIPTORS: DESCRIPTORS: ACHING

## 2022-04-04 NOTE — Clinical Note
Yesi Pineda was seen and treated in our emergency department on 4/4/2022. She may return to work on 04/06/2022. If you have any questions or concerns, please don't hesitate to call.       Arlin Lucero MD

## 2022-04-04 NOTE — Clinical Note
Valerie Billingsley was seen and treated in our emergency department on 4/4/2022. She may return to work on 04/06/2022. If you have any questions or concerns, please don't hesitate to call.       Ekaterina Ascencio MD

## 2022-04-05 NOTE — ED PROVIDER NOTES
[facility] ED  EMERGENCY DEPARTMENT ENCOUNTER      Pt Name: Ivette Azevedo  MRN: [id number]  [place] 1985  Date of evaluation: 4/4/2022  Provider: Arlin Lucero MD    CHIEF COMPLAINT       Chief Complaint   Patient presents with    Headache     onset today    Pharyngitis     pt states one of her sons has Flu and the other strep         HISTORY OF PRESENT ILLNESS   (Location/Symptom, Timing/Onset, Context/Setting, Quality, Duration, Modifying Factors, Severity)  Note limiting factors. Ivette Azevedo is a 29 y.o. female who presents to the emergency department for evaluation of fever, body aches, headaches and sore throat. States that symptoms began about 4 days ago when she had a fever. Since that time has had intermittent episodes of headache. Had some nausea and vomiting as well as diarrhea that began yesterday and a sore throat that began about 2 days ago. She has some pain with swallowing but no difficulty swallowing. States that one of her children has influenza A and the other was recently diagnosed with strep pharyngitis. Nursing Notes were reviewed. REVIEW OF SYSTEMS    (2-9 systems for level 4, 10 or more for level 5)     Review of Systems   Constitutional: Positive for fatigue and fever. HENT: Positive for congestion and sore throat. Negative for trouble swallowing. Respiratory: Negative for cough and shortness of breath. Cardiovascular: Negative for chest pain. Gastrointestinal: Positive for diarrhea, nausea and vomiting. Negative for abdominal pain. Genitourinary: Negative for difficulty urinating and dysuria. Denies possibility of pregnancy. Neurological: Positive for headaches. Except as noted above the remainder of the review of systems was reviewed and negative.        PAST MEDICAL HISTORY     Past Medical History:   Diagnosis Date    Abnormal Pap smear of cervix     leep 2016    [facility]ervical cancer ([facility] [address]     Heart murmur     Mitral valve regurgitation     Thyroid disease          SURGICAL HISTORY       Past Surgical History:   Procedure Laterality Date    BUNIONECTOMY      x3     SECTION N/A 2020     SE[facility]    FOOT SURGERY      [facility]  2017    TUBAL LIGATION  2020         CURRENT MEDICATIONS       Discharge Medication List as of 2022 10:22 PM      CONTINUE these medications which have NOT CHANGED    Details   levothyroxine (SYNTHROID) 50 MCG tablet Take 50 mcg by mouth DailyHistorical Med      ibuprofen (IBU) 600 MG tablet Take 1 tablet by mouth every 6 hours as needed for Pain, Disp-20 tablet, R-0Print      loratadine (CLARITIN) 10 MG tablet Take 10 mg by mouth dailyHistorical Med             ALLERGIES     Bactrim [sulfamethoxazole-trimethoprim]    FAMILY HISTORY       Family History   Problem Relation Age of Onset    Colon Cancer Maternal Grandmother     Hypertension Maternal Grandmother     Diabetes Maternal Grandmother     Lung Cancer Maternal Grandfather     Heart Disease Maternal Grandfather     Esophageal Cancer Maternal Grandfather     Cervical Cancer Sister     Cancer Maternal Grandfather         lung ca          SOCIAL HISTORY       Social History     Socioeconomic History    Marital status:      Spouse name: None    Number of children: None    Years of education: None    Highest education level: None   Occupational History    None   Tobacco Use    Smoking status: Never Smoker    Smokeless tobacco: Never Used   Vaping Use    Vaping Use: Never used   Substance and Sexual Activity    Alcohol use: Not Currently     Comment: socially mostly holidays    Drug use: Never    Sexual activity: Yes     Partners: Male     Birth control/protection: I.U.D.    Other Topics Concern    None   Social History Narrative    ** Merged History Encounter **          Social Determinants of Health     Financial Resource Strain:     Difficulty of Paying Living Expenses: Not on file   Food Insecurity:     Worried About Running Out of Food in the Last Year: Not on file    Angelo of Food in the Last Year: Not on file   Transportation Needs:     Lack of Transportation (Medical): Not on file    Lack of Transportation (Non-Medical): Not on file   Physical Activity:     Days of Exercise per Week: Not on file    Minutes of Exercise per Session: Not on file   Stress:     Feeling of Stress : Not on file   Social Connections:     Frequency of Communication with Friends and Family: Not on file    Frequency of Social Gatherings with Friends and Family: Not on file    Attends Yarsani Services: Not on file    Active Member of [address] [place] [address] [employer] or Organizations: Not on file    Attends Club or Organization Meetings: Not on file    Marital Status: Not on file   Intimate Partner Violence:     Fear of Current or Ex-Partner: Not on file    Emotionally Abused: Not on file    Physically Abused: Not on file    Sexually Abused: Not on file   Housing Stability:     Unable to Pay for Housing in the Last Year: Not on file    Number of [place] in the Last Year: Not on file    Unstable Housing in the Last Year: Not on file       PHYSICAL EXAM    (up to 7 for level 4, 8 or more for level 5)     ED Triage Vitals [04/04/22 2048]   BP Temp Temp Source Pulse Resp SpO2 Height Weight   (!) 144/74 98.9 °F (37.2 °C) Tympanic 75 16 98 % -- --       Physical Exam  Vitals reviewed. Constitutional:       General: She is not in acute distress. Appearance: She is well-developed. She is not ill-appearing or diaphoretic. HENT:      Head: Normocephalic and atraumatic. Nose: No rhinorrhea. Mouth/Throat:      Mouth: Mucous membranes are moist. No oral lesions. Pharynx: Oropharynx is clear. Uvula midline. No pharyngeal swelling, oropharyngeal exudate, posterior oropharyngeal erythema or uvula swelling. Tonsils: No tonsillar exudate or tonsillar abscesses.    Eyes: Conjunctiva/sclera: Conjunctivae normal.   Cardiovascular:      Rate and Rhythm: Normal rate and regular rhythm. Heart sounds: No murmur heard. Pulmonary:      Effort: Pulmonary effort is normal. No respiratory distress. Breath sounds: Normal breath sounds. No stridor. No wheezing, rhonchi or rales. Abdominal:      General: There is no distension. Palpations: Abdomen is soft. There is no mass. Tenderness: There is no abdominal tenderness. There is no guarding or rebound. Skin:     General: Skin is warm and dry. Capillary Refill: Capillary refill takes less than 2 seconds. Neurological:      Mental Status: She is alert. Psychiatric:         Mood and Affect: Mood normal.         Behavior: Behavior normal.         DIAGNOSTIC RESULTS         LABS:  Labs Reviewed   RAPID INFLUENZA A/B ANTIGENS   COVID-19, RAPID   STREP SCREEN GROUP A THROAT       All other labs were within normal range or not returned as of this dictation. EMERGENCY DEPARTMENT COURSE and DIFFERENTIAL DIAGNOSIS/MDM:   Vitals:    Vitals:    04/04/22 2048   BP: (!) 144/74   Pulse: 75   Resp: 16   Temp: 98.9 °F (37.2 °C)   TempSrc: Tympanic   SpO2: 98%       To the emergency department for evaluation of sore throat, headache, fever and recent emesis. Physical examination demonstrates a generally well-appearing patient in no acute distress that is hemodynamically stable. No findings of acute concern. Specifically, there is no evidence of RPA/PTA/epiglottitis. No evident meningitis. COVID-19/influenza/strep testing negative here. Patient feels better on reevaluation. Is amenable to the plan for discharge. Will give Zofran for use as needed. No evident pneumonia. No evident other acute infectious process that requires ED intervention or further evaluation. FINAL IMPRESSION      1.  Upper respiratory tract infection, unspecified type          DISPOSITION/PLAN   DISPOSITION Decision To Discharge 04/04/2022 11:11:53 PM      DISCHARGE MEDICATIONS:  Discharge Medication List as of 4/4/2022 10:22 PM      START taking these medications    Details   ondansetron (ZOFRAN ODT) 4 MG disintegrating tablet Take 1 tablet by mouth every 8 hours as needed for Nausea or Vomiting, Disp-12 tablet, R-0Normal           (Please note that portions of this note were completed with a voice recognition program.  Efforts were made to edit the dictations but occasionally words are mis-transcribed.)    Aylin Durna MD (electronically signed)  Attending Emergency Physician           Aylin Duran MD  04/05/22 [id number]

## 2022-12-20 PROBLEM — R10.9 ABDOMINAL PAIN: Status: RESOLVED | Noted: 2019-11-02 | Resolved: 2022-12-20

## 2022-12-20 PROBLEM — O46.90 VAGINAL BLEEDING DURING PREGNANCY: Status: RESOLVED | Noted: 2019-11-30 | Resolved: 2022-12-20

## 2022-12-20 PROBLEM — O26.891 PELVIC PAIN AFFECTING PREGNANCY IN FIRST TRIMESTER, ANTEPARTUM: Status: RESOLVED | Noted: 2019-07-04 | Resolved: 2022-12-20

## 2022-12-20 PROBLEM — R10.2 PELVIC PAIN AFFECTING PREGNANCY IN FIRST TRIMESTER, ANTEPARTUM: Status: RESOLVED | Noted: 2019-07-04 | Resolved: 2022-12-20

## 2022-12-20 PROBLEM — Z34.90 ENCOUNTER FOR INDUCTION OF LABOR: Status: RESOLVED | Noted: 2020-01-05 | Resolved: 2022-12-20

## 2023-01-24 ENCOUNTER — OFFICE VISIT (OUTPATIENT)
Dept: GASTROENTEROLOGY | Age: 38
End: 2023-01-24

## 2023-01-24 ENCOUNTER — HOSPITAL ENCOUNTER (OUTPATIENT)
Age: 38
Discharge: HOME | End: 2023-01-24
Payer: COMMERCIAL

## 2023-01-24 VITALS
RESPIRATION RATE: 16 BRPM | HEART RATE: 52 BPM | BODY MASS INDEX: 23.54 KG/M2 | TEMPERATURE: 97 F | SYSTOLIC BLOOD PRESSURE: 106 MMHG | DIASTOLIC BLOOD PRESSURE: 62 MMHG | WEIGHT: 146.5 LBS | HEIGHT: 66 IN

## 2023-01-24 DIAGNOSIS — K59.00 CONSTIPATION, UNSPECIFIED CONSTIPATION TYPE: Primary | ICD-10-CM

## 2023-01-24 LAB
FOLLICLE STIMULATING HORMONE: 71 MIU/ML (ref 1.7–21.5)
HCT VFR BLD CALC: 40.3 % (ref 36.3–47.1)
HEMOGLOBIN: 13.7 G/DL (ref 11.9–15.1)
LH: 32.7 MIU/ML (ref 1–95.6)
MCH RBC QN AUTO: 32.2 PG (ref 25.2–33.5)
MCHC RBC AUTO-ENTMCNC: 34 G/DL (ref 28.4–34.8)
MCV RBC AUTO: 94.6 FL (ref 82.6–102.9)
NRBC AUTOMATED: 0 PER 100 WBC
PDW BLD-RTO: 11.9 % (ref 11.8–14.4)
PLATELET # BLD: 207 K/UL (ref 138–453)
PMV BLD AUTO: 11 FL (ref 8.1–13.5)
RBC # BLD: 4.26 M/UL (ref 3.95–5.11)
T3 FREE: 3.11 PG/ML (ref 2.02–4.43)
THYROXINE, FREE: 1.13 NG/DL (ref 0.93–1.7)
TSH SERPL DL<=0.05 MIU/L-ACNC: 6.51 UIU/ML (ref 0.3–5)
WBC # BLD: 5.9 K/UL (ref 3.5–11.3)

## 2023-01-24 PROCEDURE — 84442 ASSAY OF THYROID ACTIVITY: CPT

## 2023-01-24 PROCEDURE — 83001 ASSAY OF GONADOTROPIN (FSH): CPT

## 2023-01-24 PROCEDURE — 83002 ASSAY OF GONADOTROPIN (LH): CPT

## 2023-01-24 PROCEDURE — 86800 THYROGLOBULIN ANTIBODY: CPT

## 2023-01-24 PROCEDURE — 84439 ASSAY OF FREE THYROXINE: CPT

## 2023-01-24 PROCEDURE — 84480 ASSAY TRIIODOTHYRONINE (T3): CPT

## 2023-01-24 PROCEDURE — 85027 COMPLETE CBC AUTOMATED: CPT

## 2023-01-24 PROCEDURE — 84443 ASSAY THYROID STIM HORMONE: CPT

## 2023-01-24 PROCEDURE — 36415 COLL VENOUS BLD VENIPUNCTURE: CPT

## 2023-01-24 PROCEDURE — 84481 FREE ASSAY (FT-3): CPT

## 2023-01-24 PROCEDURE — 84436 ASSAY OF TOTAL THYROXINE: CPT

## 2023-01-24 RX ORDER — AZELASTINE 1 MG/ML
1 SPRAY, METERED NASAL
COMMUNITY
Start: 2021-10-13

## 2023-01-24 RX ORDER — ACETAMINOPHEN 325 MG/1
TABLET ORAL EVERY 4 HOURS PRN
COMMUNITY

## 2023-01-24 RX ORDER — DOCUSATE SODIUM 100 MG/1
100 CAPSULE, LIQUID FILLED ORAL 2 TIMES DAILY
Qty: 60 CAPSULE | Refills: 0 | Status: SHIPPED | OUTPATIENT
Start: 2023-01-24 | End: 2023-02-23

## 2023-01-24 NOTE — PROGRESS NOTES
[address]  [address]    Chief Complaint   Patient presents with    New Patient     Pt here for constipation x3 years         HPI  Neida Musa is a 40year old with a history of hypothyroidism who presents with a complaint of constipation which she states has been on going for 3 years. She states that she had a baby 3 years ago and may have had some constipation during pregnancy. Currently, she states over the counter laxatives and caffeine both help - she goes once a week after taking 2 of them. She states that her maternal grandmother had colon cancer at age 71 with bowel resection. She denies any associated abdominal pain or rectal bleeding. Past Medical History:   Diagnosis Date    Abnormal Pap smear of cervix     leep 2016    Cervical cancer ([address]     Heart murmur     Mitral valve regurgitation     Thyroid disease          Past Surgical History:   Procedure Laterality Date    BUNIONECTOMY      x3     SECTION N/A 2020     SECTION performed by Yesi Reynolds MD at [facility] AT THE [facility] L&D [address] [place] [address] N/A 2022    DILATATION AND CURETTAGE HYSTEROSCOPY/CAUTERY ABLATION performed by Yesi Reynolds MD at [facility] OR    FOOT SURGERY      LEEP  2017    TUBAL LIGATION  2020         Current Outpatient Medications   Medication Sig Dispense Refill    acetaminophen (TYLENOL) 325 MG tablet Take by mouth every 4 hours as needed      azelastine (ASTELIN) 0.1 % nasal spray 1 spray      ondansetron (ZOFRAN ODT) 4 MG disintegrating tablet Take 1 tablet by mouth every 8 hours as needed for Nausea or Vomiting 12 tablet 0    levothyroxine (SYNTHROID) 50 MCG tablet Take 50 mcg by mouth Daily      ibuprofen (IBU) 600 MG tablet Take 1 tablet by mouth every 6 hours as needed for Pain 20 tablet 0    loratadine (CLARITIN) 10 MG tablet Take 10 mg by mouth daily       No current facility-administered medications for this visit.          Family History Problem Relation Age of Onset    Colon Cancer Maternal Grandmother     Hypertension Maternal Grandmother     Diabetes Maternal Grandmother     Lung Cancer Maternal Grandfather     Heart Disease Maternal Grandfather     Esophageal Cancer Maternal Grandfather     Cervical Cancer Sister     Cancer Maternal Grandfather         lung ca          Social Determinants of Health     Tobacco Use: Low Risk     Smoking Tobacco Use: Never    Smokeless Tobacco Use: Never    Passive Exposure: Not on file   Alcohol Use: Not on file   Financial Resource Strain: Not on file   Food Insecurity: Not on file   Transportation Needs: Not on file   Physical Activity: Not on file   Stress: Not on file   Social Connections: Not on file   Intimate Partner Violence: Not on file   Depression: Not on file   Housing Stability: Not on file       Review of Systems   Cardiovascular:         Mitral valve regurgitation   Endocrine:        Hypothyroidism   Hematological:         History of cervical cancer   All other systems reviewed and are negative. /62 (Site: Left Upper Arm, Position: Sitting, Cuff Size: Medium Adult)   Pulse 52   Temp 97 °F (36.1 °C) (Temporal)   Resp 16   Ht 5' 6\" (1.676 m)   Wt 146 lb 8 oz (66.5 kg)   BMI 23.65 kg/m²     Physical Exam  Constitutional:       Appearance: Normal appearance. HENT:      Head: Normocephalic. Right Ear: External ear normal.      Left Ear: External ear normal.      Nose: Nose normal.      Mouth/Throat:      Mouth: Mucous membranes are moist.   Eyes:      General: No scleral icterus. Pupils: Pupils are equal, round, and reactive to light. Cardiovascular:      Rate and Rhythm: Normal rate. Pulses: Normal pulses. Pulmonary:      Effort: Pulmonary effort is normal.   Abdominal:      General: Bowel sounds are normal.   Musculoskeletal:         General: Normal range of motion. Cervical back: Normal range of motion. Skin:     General: Skin is warm.    Neurological: General: No focal deficit present. Mental Status: She is alert. Psychiatric:         Mood and Affect: Mood normal.         Lab Results   Component Value Date    WBC 5.9 01/24/2023    HGB 13.7 01/24/2023    HCT 40.3 01/24/2023    MCV 94.6 01/24/2023     01/24/2023        Lab Results   Component Value Date     03/11/2022    K 3.7 03/11/2022     03/11/2022    CO2 29 03/11/2022    BUN 19 03/11/2022    CREATININE 0.67 03/11/2022    GLUCOSE 97 12/20/2022    CALCIUM 9.6 03/11/2022    PROT 7.8 03/11/2022    LABALBU 4.9 03/11/2022    BILITOT 0.29 (L) 03/11/2022    ALKPHOS 92 03/11/2022    AST 20 03/11/2022    ALT 14 03/11/2022    LABGLOM >60 03/11/2022    GFRAA >60 03/11/2022         Assessment      Nadege Bazan is a 40year old with a history of hypothyroidism, cervical cancer who presents with a 3 year history of constipation which started after the birth of her youngest child who is also 1years old. Of concern is that the constipation started during pregnancy and persisted. Her maternal grandmother had colon cancer at age 71 and her age at diagnosis is not clear. A colonoscopy is recommended, but she would prefer to start with a stool softener for now until she feels she needs to have a colon assessment. If there is improvement with a stool softener    Plan    1. Constipation, unspecified constipation type  - docusate sodium (COLACE) 100 MG capsule; Take 1 capsule by mouth 2 times daily  Dispense: 60 capsule; Refill: 0    2. F/U in 3 weeks      Spent 20 minutes with the patient with greater than 50 percent of the time was spent on face-to-face time in discussion with the patient regarding diagnostic options/results, treatment options, counseling, and follow-up plan.       Juan Madrigal MD

## 2023-01-24 NOTE — PATIENT INSTRUCTIONS
SURVEY:    You may be receiving a survey from [employer] regarding your visit today. Please complete the survey to enable us to provide the highest quality of care to you and your family. If you cannot score us a very good on any question, please call the office to discuss how we could have made your experience a very good one. Thank you.

## 2023-01-25 LAB
T3 TOTAL: 97 NG/DL (ref 60–181)
T4 TOTAL: 7.4 UG/DL (ref 4.5–10.9)

## 2023-01-26 LAB
THYROGLOBULIN AB: 213 IU/ML (ref 0–40)
THYROXINE BINDING GLOBULIN: 29.8 UG/ML (ref 13–30)

## 2023-03-06 ENCOUNTER — OFFICE VISIT (OUTPATIENT)
Dept: GASTROENTEROLOGY | Age: 38
End: 2023-03-06

## 2023-03-06 ENCOUNTER — OFFICE VISIT (OUTPATIENT)
Dept: NEUROLOGY | Age: 38
End: 2023-03-06
Payer: COMMERCIAL

## 2023-03-06 VITALS
HEIGHT: 66 IN | BODY MASS INDEX: 23.92 KG/M2 | DIASTOLIC BLOOD PRESSURE: 75 MMHG | HEART RATE: 50 BPM | TEMPERATURE: 97.5 F | WEIGHT: 148.81 LBS | SYSTOLIC BLOOD PRESSURE: 121 MMHG | RESPIRATION RATE: 18 BRPM

## 2023-03-06 VITALS
HEIGHT: 66 IN | BODY MASS INDEX: 23.91 KG/M2 | RESPIRATION RATE: 18 BRPM | HEART RATE: 60 BPM | SYSTOLIC BLOOD PRESSURE: 122 MMHG | TEMPERATURE: 96.9 F | WEIGHT: 148.8 LBS | DIASTOLIC BLOOD PRESSURE: 70 MMHG

## 2023-03-06 DIAGNOSIS — K59.00 CONSTIPATION, UNSPECIFIED CONSTIPATION TYPE: Primary | ICD-10-CM

## 2023-03-06 DIAGNOSIS — G43.719 INTRACTABLE CHRONIC MIGRAINE WITHOUT AURA AND WITHOUT STATUS MIGRAINOSUS: Primary | ICD-10-CM

## 2023-03-06 PROCEDURE — G8484 FLU IMMUNIZE NO ADMIN: HCPCS | Performed by: NEUROMUSCULOSKELETAL MEDICINE, SPORTS MEDICINE

## 2023-03-06 PROCEDURE — 99243 OFF/OP CNSLTJ NEW/EST LOW 30: CPT | Performed by: NEUROMUSCULOSKELETAL MEDICINE, SPORTS MEDICINE

## 2023-03-06 PROCEDURE — G8428 CUR MEDS NOT DOCUMENT: HCPCS | Performed by: NEUROMUSCULOSKELETAL MEDICINE, SPORTS MEDICINE

## 2023-03-06 PROCEDURE — G8420 CALC BMI NORM PARAMETERS: HCPCS | Performed by: NEUROMUSCULOSKELETAL MEDICINE, SPORTS MEDICINE

## 2023-03-06 RX ORDER — MEDROXYPROGESTERONE ACETATE 2.5 MG/1
2.5 TABLET ORAL DAILY
COMMUNITY

## 2023-03-06 RX ORDER — TOPIRAMATE 25 MG/1
25 TABLET ORAL 2 TIMES DAILY
Qty: 60 TABLET | Refills: 5 | Status: SHIPPED | OUTPATIENT
Start: 2023-03-06 | End: 2023-03-06

## 2023-03-06 RX ORDER — ESTRADIOL 1 MG/1
1 TABLET ORAL DAILY
COMMUNITY

## 2023-03-06 RX ORDER — TOPIRAMATE 25 MG/1
25 TABLET ORAL NIGHTLY
Qty: 30 TABLET | Refills: 1 | Status: SHIPPED | OUTPATIENT
Start: 2023-03-06 | End: 2023-04-05

## 2023-03-06 RX ORDER — POLYETHYLENE GLYCOL 3350 17 G/17G
17 POWDER, FOR SOLUTION ORAL DAILY
Qty: 30 PACKET | Refills: 2 | Status: SHIPPED | OUTPATIENT
Start: 2023-03-06 | End: 2023-04-05

## 2023-03-06 NOTE — PATIENT INSTRUCTIONS
SURVEY:    You may be receiving a survey from [employer] regarding your visit today. Please complete the survey to enable us to provide the highest quality of care to you and your family. If you cannot score us a very good on any question, please call the office to discuss how we could have made your experience a very good one. Thank you.

## 2023-03-06 NOTE — PROGRESS NOTES
NEUROLOGY CONSULT    Patient Name:  Tien Abdi  :   1985  Clinic Visit Date: 3/6/2023    I saw Ms. Tien Abdi  in the neurology clinic today for headaches. 28-year-old lady with history of cervical cancer, hypothyroidism, in the office today with complaints of recurrent  right-sided headaches since about a year. The headaches are described as usually sharp , right occipital and temporal in location, occurring at least 2-3 times a week, accompanied by blurred vision in the right peripheral visual field. Headache intensity is approximately 8-10/10 when severe, with accompanying nausea. Headaches usually last several hours, and sometimes an entire day. She usually takes Tylenol or NSAIDs with very minimal improvement. Also experiences intermittent right-sided neck pain which is worse when she has the headache as outlined above. No history of trauma. Frequently she experiences a sensation of tingling  over the right side of the scalp when she walker her hair. She also complains of intermittent throbbing pain in her right arm without weakness or paresthesia in the right upper extremity. No other significant neurological symptoms reported at this time. She recently had a CT scan of the brain, which was unremarkable. REVIEW OF SYSTEMS    Constitutional Weight changes: absent, change in appetite: absent Fatigue: absent; Fevers : absent, Any recent hospitalizations:  absent   HEENT Ears: normal,  Visual disturbance: absent   Respiratory Shortness of breath: present, choking:  absent, Cough: absent, Snoring : absent   Cardiovascular Chest pain: present, Leg swelling :absent, palpitations : absent, fainting : absent   GI Constipation: present, Diarrhea: absent, Swallowing change: absent    Urinary frequency: absent, Urinary urgency: absent, Urinary incontinence: absent   Musculoskeletal Neck pain: absent, Back pain: absent, Stiffness: absent, Muscle pain: absent, Joint pain: absent, restless leg : absent   Dermatological Hair loss: absent, Skin changes: absent   Neurological Confusion: present, Trouble concentrating: present, Seizures: absent;  Memory loss: present, balance problem: present, Dizziness: present, vertigo: absent, Weakness: absent, Numbness absent, Tremor: absent, Spasm: absent, involuntary movement: absent, Speech difficulty: absent, Headache: present, Light sensitivity: present   Psychiatric Anxiety: absent, Depression  absent, drug abuse: absent, Hallucination: absent, mood disorder: absent, Suicidal ideations absent   Hematologic Abnormal bleeding: absent, Anemia: absent, Lymph gland changes: absent Clotting disorder: absent     Past Medical History:   Diagnosis Date    Abnormal Pap smear of cervix     leep 2016    Cervical cancer ([address]     Heart murmur     Mitral valve regurgitation     Thyroid disease        Past Surgical History:   Procedure Laterality Date    BUNIONECTOMY      x3     SECTION N/A 2020     SECTION performed by Lyndsey Crain MD at [facility] AT THE [facility] L&D [address] [place] [address] N/A 2022    DILATATION AND CURETTAGE HYSTEROSCOPY/CAUTERY ABLATION performed by Lyndsey Crain MD at [address]  2017    TUBAL LIGATION  2020       Social History     Socioeconomic History    Marital status:      Spouse name: Not on file    Number of children: Not on file    Years of education: Not on file    Highest education level: Not on file   Occupational History    Not on file   Tobacco Use    Smoking status: Never    Smokeless tobacco: Never   Vaping Use    Vaping Use: Never used   Substance and Sexual Activity    Alcohol use: Not Currently     Comment: socially mostly holidays    Drug use: Never    Sexual activity: Yes     Partners: Male     Birth control/protection: I.U.D.    Other Topics Concern    Not on file   Social History Narrative    ** Merged History Encounter **          Social Determinants of Health     Financial Resource Strain: Not on file   Food Insecurity: Not on file   Transportation Needs: Not on file   Physical Activity: Not on file   Stress: Not on file   Social Connections: Not on file   Intimate Partner Violence: Not on file   Housing Stability: Not on file       Family History   Problem Relation Age of Onset    Colon Cancer Maternal Grandmother     Hypertension Maternal Grandmother     Diabetes Maternal Grandmother     Lung Cancer Maternal Grandfather     Heart Disease Maternal Grandfather     Esophageal Cancer Maternal Grandfather     Cervical Cancer Sister     Cancer Maternal Grandfather         lung ca       Current Outpatient Medications   Medication Sig Dispense Refill    topiramate (TOPAMAX) 25 MG tablet Take 1 tablet by mouth at bedtime 30 tablet 1    levothyroxine (SYNTHROID) 50 MCG tablet Take 50 mcg by mouth Daily      ibuprofen (IBU) 600 MG tablet Take 1 tablet by mouth every 6 hours as needed for Pain 20 tablet 0    loratadine (CLARITIN) 10 MG tablet Take 10 mg by mouth daily      acetaminophen (TYLENOL) 325 MG tablet Take by mouth every 4 hours as needed (Patient not taking: Reported on 3/6/2023)      azelastine (ASTELIN) 0.1 % nasal spray 1 spray (Patient not taking: Reported on 3/6/2023)      ondansetron (ZOFRAN ODT) 4 MG disintegrating tablet Take 1 tablet by mouth every 8 hours as needed for Nausea or Vomiting (Patient not taking: Reported on 3/6/2023) 12 tablet 0     No current facility-administered medications for this visit.      DATA:  Lab Results   Component Value Date    WBC 5.9 01/24/2023    HGB 13.7 01/24/2023     01/24/2023    ALT 14 03/11/2022    AST 20 03/11/2022     03/11/2022    K 3.7 03/11/2022     03/11/2022    CREATININE 0.67 03/11/2022    BUN 19 03/11/2022    CO2 29 03/11/2022    TSH 6.51 (H) 01/24/2023       /70 (Site: Right Upper Arm, Position: Sitting, Cuff Size: Medium Adult)   Pulse 60   Temp 96.9 °F (36.1 °C) (Temporal)   Resp 18   Ht 5' 6\"  (1.676 m)   Wt 148 lb 12.8 oz (67.5 kg)   BMI 24.02 kg/m²     NEUROLOGICAL EXAMINATION:     MENTAL STATUS:.  Normal.    CRANIAL NERVES: Pupils are equal and reactive.  EOMS are complete.  No abnormal eye movements.  Facial sensation is normal.  No facial weakness.  Hearing is normal..  Palate and tongue movements are normal.  Shoulder shrug is symmetrical.    MOTOR EXAMINATION: Muscle tone is normal Strength is 5/5 in both upper and lower limbs.  No abnormal limb movements.     SENSORY EXAMINATION: Normal.     STRETCH REFLEXES: 2+ and symmetrical in both the upper and lower limbs.      GAIT:.  Normal.    Mild tenderness + over the cervical paraspinal  and trapezius muscles on the right side.  No limitation of cervical range of motion.  Tenderness  is also present over the right occipital region.    IMPRESSION:.  33-year-old lady with a history suggestive of recurrent migraine headaches with a differential diagnosis of musculoskeletal right-sided muscle contraction headaches versus occipital neuralgia.  Normal neurologic examination    PLAN:    1.  Topiramate 25 mg/day   2.  Local measures with massage ,over the right side of the neck and occipital region.  3.  Follow-up in 6 weeks     NOTE: This neurology evaluation is part of outpatient coverage at [facility]  1-2 days per week.  Patients requiring frequent evaluations or uncomfortable with potential 3-4 day turnaround on questions or calls  may be better served by a neurologist in the area full time.  [facility] at [facility] is available for outpatient visits and procedures including EMG/NCS.  Non-[facility] neurologists also practice in [place] (Dr. Cohen) and [place] (Dr's Danner, Benedikt).       Ag Jones MD   3/6/2023  2:01 PM

## 2023-04-24 ENCOUNTER — OFFICE VISIT (OUTPATIENT)
Dept: NEUROLOGY | Age: 38
End: 2023-04-24
Payer: COMMERCIAL

## 2023-04-24 ENCOUNTER — OFFICE VISIT (OUTPATIENT)
Dept: GASTROENTEROLOGY | Age: 38
End: 2023-04-24

## 2023-04-24 VITALS
HEIGHT: 66 IN | WEIGHT: 151.5 LBS | HEART RATE: 50 BPM | RESPIRATION RATE: 16 BRPM | TEMPERATURE: 97.8 F | SYSTOLIC BLOOD PRESSURE: 104 MMHG | BODY MASS INDEX: 24.35 KG/M2 | DIASTOLIC BLOOD PRESSURE: 64 MMHG

## 2023-04-24 VITALS
DIASTOLIC BLOOD PRESSURE: 68 MMHG | TEMPERATURE: 97.3 F | WEIGHT: 151.1 LBS | SYSTOLIC BLOOD PRESSURE: 116 MMHG | RESPIRATION RATE: 18 BRPM | HEIGHT: 66 IN | HEART RATE: 52 BPM | BODY MASS INDEX: 24.28 KG/M2

## 2023-04-24 DIAGNOSIS — K59.00 CONSTIPATION, UNSPECIFIED CONSTIPATION TYPE: Primary | ICD-10-CM

## 2023-04-24 DIAGNOSIS — G43.719 INTRACTABLE CHRONIC MIGRAINE WITHOUT AURA AND WITHOUT STATUS MIGRAINOSUS: Primary | ICD-10-CM

## 2023-04-24 DIAGNOSIS — Z80.0 FAMILY HISTORY OF COLON CANCER: ICD-10-CM

## 2023-04-24 PROCEDURE — 99213 OFFICE O/P EST LOW 20 MIN: CPT | Performed by: NEUROMUSCULOSKELETAL MEDICINE, SPORTS MEDICINE

## 2023-04-24 PROCEDURE — 1036F TOBACCO NON-USER: CPT | Performed by: NEUROMUSCULOSKELETAL MEDICINE, SPORTS MEDICINE

## 2023-04-24 PROCEDURE — G8427 DOCREV CUR MEDS BY ELIG CLIN: HCPCS | Performed by: NEUROMUSCULOSKELETAL MEDICINE, SPORTS MEDICINE

## 2023-04-24 PROCEDURE — G8420 CALC BMI NORM PARAMETERS: HCPCS | Performed by: NEUROMUSCULOSKELETAL MEDICINE, SPORTS MEDICINE

## 2023-04-24 RX ORDER — NORTRIPTYLINE HYDROCHLORIDE 25 MG/1
25 CAPSULE ORAL NIGHTLY
Qty: 30 CAPSULE | Refills: 1 | Status: SHIPPED | OUTPATIENT
Start: 2023-04-24 | End: 2023-05-24

## 2023-04-24 RX ORDER — RIZATRIPTAN BENZOATE 10 MG/1
10 TABLET ORAL
Qty: 10 TABLET | Refills: 1 | Status: SHIPPED | OUTPATIENT
Start: 2023-04-24 | End: 2023-04-24

## 2023-04-24 RX ORDER — POLYETHYLENE GLYCOL 3350, SODIUM CHLORIDE, SODIUM BICARBONATE, POTASSIUM CHLORIDE 420; 11.2; 5.72; 1.48 G/4L; G/4L; G/4L; G/4L
4000 POWDER, FOR SOLUTION ORAL ONCE
Qty: 4000 ML | Refills: 0 | Status: SHIPPED | OUTPATIENT
Start: 2023-04-24 | End: 2023-04-24

## 2023-04-24 NOTE — PROGRESS NOTES
NEUROLOGY Follow up  Patient Name:  Zander Alvarado  :   1985  Clinic Visit Date: 2023    I saw Ms. Zander Alvarado  in the neurology clinic today for follow up, and headaches. Extract  from my initial consultation is as follows. 79-year-old lady with history of cervical cancer, hypothyroidism, in the office today with complaints of recurrent  right-sided headaches since about a year. The headaches are described as usually sharp , right occipital and temporal in location, occurring at least 2-3 times a week, accompanied by blurred vision in the right peripheral visual field. Headache intensity is approximately 8-10/10 when severe, with accompanying nausea. Headaches usually last several hours, and sometimes an entire day. She usually takes Tylenol or NSAIDs with very minimal improvement. Also experiences intermittent right-sided neck pain which is worse when she has the headache as outlined above. No history of trauma. Frequently she experiences a sensation of tingling  over the right side of the scalp when she walker her hair. She also complains of intermittent throbbing pain in her right arm without weakness or paresthesia in the right upper extremity. No other significant neurological symptoms reported at this time. She recently had a CT scan of the brain, which was unremarkable. Headaches persist.  She was not able to take Topamax because of side effects. Headache frequency and severity has not changed. REVIEW OF SYSTEMS    Constitutional Weight changes: absent, change in appetite: absent Fatigue: absent; Fevers : absent, Any recent hospitalizations:  absent   HEENT Ears: normal,  Visual disturbance: absent   Respiratory Shortness of breath: absent, choking:  absent, Cough: absent, Snoring : absent   Cardiovascular Chest pain: absent, Leg swelling :absent, palpitations : absent, fainting : absent   GI Constipation: present, Diarrhea: absent, Swallowing change: absent

## 2023-04-24 NOTE — PATIENT INSTRUCTIONS
SURVEY:    You may be receiving a survey from [employer] regarding your visit today. Please complete the survey to enable us to provide the highest quality of care to you and your family. If you cannot score us a very good on any question, please call the office to discuss how we could have made your experience a very good one. Thank you.

## 2023-04-30 ASSESSMENT — ENCOUNTER SYMPTOMS: CONSTIPATION: 1

## 2023-05-07 ASSESSMENT — ENCOUNTER SYMPTOMS: CONSTIPATION: 1

## 2025-01-23 ENCOUNTER — TRANSCRIBE ORDERS (OUTPATIENT)
Dept: ADMINISTRATIVE | Age: 40
End: 2025-01-23

## 2025-01-23 DIAGNOSIS — R92.8 ABNORMAL MAMMOGRAM: Primary | ICD-10-CM

## 2025-02-14 ENCOUNTER — HOSPITAL ENCOUNTER (OUTPATIENT)
Dept: WOMENS IMAGING | Age: 40
Discharge: HOME | End: 2025-02-16
Payer: COMMERCIAL

## 2025-02-14 ENCOUNTER — HOSPITAL ENCOUNTER (OUTPATIENT)
Dept: ULTRASOUND IMAGING | Age: 40
Discharge: HOME | End: 2025-02-16
Payer: COMMERCIAL

## 2025-02-14 VITALS — HEIGHT: 66 IN | WEIGHT: 140 LBS | BODY MASS INDEX: 22.5 KG/M2

## 2025-02-14 DIAGNOSIS — R92.8 ABNORMAL MAMMOGRAM: ICD-10-CM

## 2025-02-14 PROCEDURE — G0279 TOMOSYNTHESIS, MAMMO: HCPCS

## 2025-02-14 PROCEDURE — 76642 ULTRASOUND BREAST LIMITED: CPT

## 2025-04-17 ENCOUNTER — TRANSCRIBE ORDERS (OUTPATIENT)
Dept: ADMINISTRATIVE | Age: 40
End: 2025-04-17

## 2025-04-17 DIAGNOSIS — H91.90 HEARING LOSS, UNSPECIFIED HEARING LOSS TYPE, UNSPECIFIED LATERALITY: ICD-10-CM

## 2025-04-17 DIAGNOSIS — R51.9 WORSENING HEADACHES: Primary | ICD-10-CM

## 2025-04-20 ENCOUNTER — TRANSCRIBE ORDERS (OUTPATIENT)
Dept: ADMINISTRATIVE | Age: 40
End: 2025-04-20

## 2025-04-20 DIAGNOSIS — E06.3 HASHIMOTO THYROIDITIS: Primary | ICD-10-CM

## 2025-04-24 ENCOUNTER — HOSPITAL ENCOUNTER (OUTPATIENT)
Dept: NON INVASIVE DIAGNOSTICS | Age: 40
Discharge: HOME | End: 2025-04-24
Payer: COMMERCIAL

## 2025-04-24 ENCOUNTER — TRANSCRIBE ORDERS (OUTPATIENT)
Dept: ADMISSION | Age: 40
End: 2025-04-24

## 2025-04-24 ENCOUNTER — HOSPITAL ENCOUNTER (OUTPATIENT)
Age: 40
Discharge: HOME | End: 2025-04-24
Payer: COMMERCIAL

## 2025-04-24 DIAGNOSIS — R07.9 ACUTE CHEST PAIN: Primary | ICD-10-CM

## 2025-04-24 DIAGNOSIS — R07.9 ACUTE CHEST PAIN: ICD-10-CM

## 2025-04-24 LAB
ALBUMIN SERPL-MCNC: 4.6 G/DL (ref 3.5–5.2)
ALBUMIN/GLOB SERPL: 1.6 {RATIO} (ref 1–2.5)
ALP SERPL-CCNC: 99 U/L (ref 35–104)
ALT SERPL-CCNC: 38 U/L (ref 10–35)
ANION GAP SERPL CALCULATED.3IONS-SCNC: 8 MMOL/L (ref 9–16)
AST SERPL-CCNC: 28 U/L (ref 10–35)
BILIRUB SERPL-MCNC: 0.4 MG/DL (ref 0–1.2)
BUN SERPL-MCNC: 10 MG/DL (ref 6–20)
BUN/CREAT SERPL: 14 (ref 9–20)
CALCIUM SERPL-MCNC: 9.5 MG/DL (ref 8.6–10.4)
CHLORIDE SERPL-SCNC: 104 MMOL/L (ref 98–107)
CO2 SERPL-SCNC: 29 MMOL/L (ref 20–31)
CREAT SERPL-MCNC: 0.7 MG/DL (ref 0.5–0.9)
D DIMER PPP FEU-MCNC: <0.27 UG/ML FEU (ref 0–0.59)
ERYTHROCYTE [DISTWIDTH] IN BLOOD BY AUTOMATED COUNT: 11.6 % (ref 11.8–14.4)
GFR, ESTIMATED: >90 ML/MIN/1.73M2
GLUCOSE SERPL-MCNC: 87 MG/DL (ref 74–99)
HCT VFR BLD AUTO: 43.2 % (ref 36.3–47.1)
HGB BLD-MCNC: 14.3 G/DL (ref 11.9–15.1)
MCH RBC QN AUTO: 31.4 PG (ref 25.2–33.5)
MCHC RBC AUTO-ENTMCNC: 33.1 G/DL (ref 28.4–34.8)
MCV RBC AUTO: 94.7 FL (ref 82.6–102.9)
NRBC BLD-RTO: 0 PER 100 WBC
PLATELET # BLD AUTO: 211 K/UL (ref 138–453)
PMV BLD AUTO: 10.9 FL (ref 8.1–13.5)
POTASSIUM SERPL-SCNC: 4.1 MMOL/L (ref 3.7–5.3)
PROT SERPL-MCNC: 7.4 G/DL (ref 6.6–8.7)
RBC # BLD AUTO: 4.56 M/UL (ref 3.95–5.11)
SODIUM SERPL-SCNC: 141 MMOL/L (ref 136–145)
T4 FREE SERPL-MCNC: 1.2 NG/DL (ref 0.9–1.7)
TROPONIN I SERPL HS-MCNC: <6 NG/L (ref 0–14)
TSH SERPL DL<=0.05 MIU/L-ACNC: 2.26 UIU/ML (ref 0.27–4.2)
WBC OTHER # BLD: 5.2 K/UL (ref 3.5–11.3)

## 2025-04-24 PROCEDURE — 80053 COMPREHEN METABOLIC PANEL: CPT

## 2025-04-24 PROCEDURE — 85027 COMPLETE CBC AUTOMATED: CPT

## 2025-04-24 PROCEDURE — 36415 COLL VENOUS BLD VENIPUNCTURE: CPT

## 2025-04-24 PROCEDURE — 85379 FIBRIN DEGRADATION QUANT: CPT

## 2025-04-24 PROCEDURE — 93005 ELECTROCARDIOGRAM TRACING: CPT

## 2025-04-24 PROCEDURE — 84484 ASSAY OF TROPONIN QUANT: CPT

## 2025-04-24 PROCEDURE — 84439 ASSAY OF FREE THYROXINE: CPT

## 2025-04-24 PROCEDURE — 84443 ASSAY THYROID STIM HORMONE: CPT

## 2025-04-25 LAB
EKG ATRIAL RATE: 52 BPM
EKG P AXIS: 68 DEGREES
EKG P-R INTERVAL: 132 MS
EKG Q-T INTERVAL: 440 MS
EKG QRS DURATION: 88 MS
EKG QTC CALCULATION (BAZETT): 409 MS
EKG R AXIS: 79 DEGREES
EKG T AXIS: 61 DEGREES
EKG VENTRICULAR RATE: 52 BPM

## 2025-05-13 ENCOUNTER — HOSPITAL ENCOUNTER (OUTPATIENT)
Dept: MRI IMAGING | Age: 40
Discharge: HOME | End: 2025-05-15
Payer: COMMERCIAL

## 2025-05-13 ENCOUNTER — HOSPITAL ENCOUNTER (OUTPATIENT)
Dept: ULTRASOUND IMAGING | Age: 40
Discharge: HOME | End: 2025-05-15
Payer: COMMERCIAL

## 2025-05-13 DIAGNOSIS — E06.3 HASHIMOTO THYROIDITIS: ICD-10-CM

## 2025-05-13 DIAGNOSIS — H91.90 HEARING LOSS, UNSPECIFIED HEARING LOSS TYPE, UNSPECIFIED LATERALITY: ICD-10-CM

## 2025-05-13 DIAGNOSIS — R51.9 WORSENING HEADACHES: ICD-10-CM

## 2025-05-13 PROCEDURE — 70551 MRI BRAIN STEM W/O DYE: CPT

## 2025-05-13 PROCEDURE — 76536 US EXAM OF HEAD AND NECK: CPT

## 2025-07-22 ENCOUNTER — APPOINTMENT (OUTPATIENT)
Dept: GENERAL RADIOLOGY | Age: 40
End: 2025-07-22
Payer: COMMERCIAL

## 2025-07-22 ENCOUNTER — HOSPITAL ENCOUNTER (EMERGENCY)
Age: 40
Discharge: HOME | End: 2025-07-22
Payer: COMMERCIAL

## 2025-07-22 VITALS
HEIGHT: 66 IN | DIASTOLIC BLOOD PRESSURE: 67 MMHG | BODY MASS INDEX: 23.3 KG/M2 | SYSTOLIC BLOOD PRESSURE: 116 MMHG | HEART RATE: 68 BPM | OXYGEN SATURATION: 99 % | RESPIRATION RATE: 15 BRPM | TEMPERATURE: 97 F | WEIGHT: 145 LBS
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
## 2025-07-22 DIAGNOSIS — S93.401A SPRAIN OF RIGHT ANKLE, UNSPECIFIED LIGAMENT, INITIAL ENCOUNTER: ICD-10-CM

## 2025-07-22 DIAGNOSIS — R42 EPISODIC LIGHTHEADEDNESS: ICD-10-CM

## 2025-07-22 LAB
ALBUMIN SERPL-MCNC: 4.3 G/DL (ref 3.5–5.2)
ALBUMIN/GLOB SERPL: 1.6 {RATIO} (ref 1–2.5)
ALP SERPL-CCNC: 95 U/L (ref 35–104)
ALT SERPL-CCNC: 21 U/L (ref 10–35)
ANION GAP SERPL CALCULATED.3IONS-SCNC: 9 MMOL/L (ref 9–16)
AST SERPL-CCNC: 24 U/L (ref 10–35)
BASOPHILS # BLD: <0.03 K/UL (ref 0–0.2)
BASOPHILS NFR BLD: 0 % (ref 0–2)
BILIRUB SERPL-MCNC: 0.2 MG/DL (ref 0–1.2)
BNP SERPL-MCNC: 75 PG/ML (ref 0–125)
BUN SERPL-MCNC: 11 MG/DL (ref 6–20)
BUN/CREAT SERPL: 16 (ref 9–20)
CALCIUM SERPL-MCNC: 9 MG/DL (ref 8.6–10.4)
CHLORIDE SERPL-SCNC: 104 MMOL/L (ref 98–107)
CO2 SERPL-SCNC: 25 MMOL/L (ref 20–31)
CREAT SERPL-MCNC: 0.7 MG/DL (ref 0.5–0.9)
EKG ATRIAL RATE: 56 BPM
EKG P AXIS: 63 DEGREES
EKG P-R INTERVAL: 150 MS
EKG Q-T INTERVAL: 426 MS
EKG QRS DURATION: 86 MS
EKG QTC CALCULATION (BAZETT): 411 MS
EKG R AXIS: 23 DEGREES
EKG T AXIS: 43 DEGREES
EKG VENTRICULAR RATE: 56 BPM
EOSINOPHIL # BLD: 0.04 K/UL (ref 0–0.44)
EOSINOPHILS RELATIVE PERCENT: 1 % (ref 1–4)
ERYTHROCYTE [DISTWIDTH] IN BLOOD BY AUTOMATED COUNT: 11.4 % (ref 11.8–14.4)
GFR, ESTIMATED: >90 ML/MIN/1.73M2
GLUCOSE SERPL-MCNC: 131 MG/DL (ref 74–99)
HCT VFR BLD AUTO: 37.9 % (ref 36.3–47.1)
HGB BLD-MCNC: 13 G/DL (ref 11.9–15.1)
IMM GRANULOCYTES # BLD AUTO: <0.03 K/UL (ref 0–0.3)
IMM GRANULOCYTES NFR BLD: 0 %
LYMPHOCYTES NFR BLD: 0.97 K/UL (ref 1.1–3.7)
LYMPHOCYTES RELATIVE PERCENT: 19 % (ref 24–43)
MAGNESIUM SERPL-MCNC: 2 MG/DL (ref 1.6–2.6)
MCH RBC QN AUTO: 31.9 PG (ref 25.2–33.5)
MCHC RBC AUTO-ENTMCNC: 34.3 G/DL (ref 28.4–34.8)
MCV RBC AUTO: 92.9 FL (ref 82.6–102.9)
MONOCYTES NFR BLD: 0.24 K/UL (ref 0.1–1.2)
MONOCYTES NFR BLD: 5 % (ref 3–12)
NEUTROPHILS NFR BLD: 75 % (ref 36–65)
NEUTS SEG NFR BLD: 3.84 K/UL (ref 1.5–8.1)
NRBC BLD-RTO: 0 PER 100 WBC
PLATELET # BLD AUTO: 156 K/UL (ref 138–453)
PMV BLD AUTO: 10.5 FL (ref 8.1–13.5)
POTASSIUM SERPL-SCNC: 4.2 MMOL/L (ref 3.7–5.3)
PROT SERPL-MCNC: 6.9 G/DL (ref 6.6–8.7)
RBC # BLD AUTO: 4.08 M/UL (ref 3.95–5.11)
SODIUM SERPL-SCNC: 138 MMOL/L (ref 136–145)
TROPONIN I SERPL HS-MCNC: <6 NG/L (ref 0–14)
WBC OTHER # BLD: 5.1 K/UL (ref 3.5–11.3)

## 2025-07-22 PROCEDURE — 84484 ASSAY OF TROPONIN QUANT: CPT

## 2025-07-22 PROCEDURE — 83735 ASSAY OF MAGNESIUM: CPT

## 2025-07-22 PROCEDURE — 85025 COMPLETE CBC W/AUTO DIFF WBC: CPT

## 2025-07-22 PROCEDURE — 73610 X-RAY EXAM OF ANKLE: CPT

## 2025-07-22 PROCEDURE — 93010 ELECTROCARDIOGRAM REPORT: CPT | Performed by: INTERNAL MEDICINE

## 2025-07-22 PROCEDURE — 71045 X-RAY EXAM CHEST 1 VIEW: CPT

## 2025-07-22 PROCEDURE — 99285 EMERGENCY DEPT VISIT HI MDM: CPT

## 2025-07-22 PROCEDURE — 80053 COMPREHEN METABOLIC PANEL: CPT

## 2025-07-22 PROCEDURE — 83880 ASSAY OF NATRIURETIC PEPTIDE: CPT

## 2025-07-22 PROCEDURE — 93005 ELECTROCARDIOGRAM TRACING: CPT

## 2025-07-22 ASSESSMENT — PAIN DESCRIPTION - PAIN TYPE: TYPE: ACUTE PAIN

## 2025-07-22 ASSESSMENT — PAIN DESCRIPTION - LOCATION
LOCATION: ANKLE;FOOT
LOCATION: ANKLE

## 2025-07-22 ASSESSMENT — LIFESTYLE VARIABLES
HOW MANY STANDARD DRINKS CONTAINING ALCOHOL DO YOU HAVE ON A TYPICAL DAY: PATIENT DOES NOT DRINK
HOW OFTEN DO YOU HAVE A DRINK CONTAINING ALCOHOL: NEVER

## 2025-07-22 ASSESSMENT — PAIN DESCRIPTION - ORIENTATION
ORIENTATION: RIGHT
ORIENTATION: RIGHT

## 2025-07-22 ASSESSMENT — PAIN DESCRIPTION - DESCRIPTORS: DESCRIPTORS: SHARP

## 2025-07-22 ASSESSMENT — PAIN DESCRIPTION - FREQUENCY: FREQUENCY: CONTINUOUS

## 2025-07-22 ASSESSMENT — PAIN SCALES - GENERAL
PAINLEVEL_OUTOF10: 5
PAINLEVEL_OUTOF10: 4

## 2025-07-22 ASSESSMENT — PAIN - FUNCTIONAL ASSESSMENT: PAIN_FUNCTIONAL_ASSESSMENT: 0-10

## 2025-07-22 NOTE — DISCHARGE INSTRUCTIONS
Take your medication as indicated and prescribed.  For pain use acetaminophen (Tylenol) or ibuprofen (Motrin / Advil), unless prescribed medications that have acetaminophen or ibuprofen (or similar medications) in it.     Use an ice pack or bag filled with ice and apply to the injured area 3 - 4 times a day for 15 - 20 minutes each time.  If the injury is older than 3 days, then use a heating pad to help relax the muscles.    PLEASE RETURN TO THE EMERGENCY DEPARTMENT IMMEDIATELY for worsening of pain, worsen swelling, inability to move, or if you develop any concerning symptoms such as: high fever not relieved by acetaminophen (Tylenol) and/or ibuprofen (Motrin / Advil), chills, feeling of your heart fluttering or racing, persistent nausea and/or vomiting, vomiting up blood, blood in your stool, loss of consciousness, numbness, weakness or tingling in the arms or legs or change in color of the extremities, changes in mental status, persistent headache, blurry vision, loss of bladder / bowel control, unable to follow up with your physician, or other any other care or concern.

## 2025-07-22 NOTE — ED PROVIDER NOTES
[facility]  EMERGENCY DEPARTMENT ENCOUNTER        Pt Name: Nadege Vasquez  MRN: [id number]  Birthdate 1985  Date of evaluation: 7/22/2025  Provider: Karen Rene MD  PCP: Flor Hernandez, JACKIE - CNP  Note Started: 7:33 AM EDT 7/22/25    CHIEF COMPLAINT       Chief Complaint   Patient presents with    Fall     Pt was getting ready for work, walked down the stairs and became light headed. Fell down 3 steps and rolled right ankle. Pt states she's been seeing her pcp for the last 3-4 weeks for the light headed symptoms. Took naproxen pta.        HISTORY OF PRESENT ILLNESS: 1 or more Elements     Nadege Vasquez is a 39 y.o. female past medical history of early menopause, thyroid disease, depression and anxiety who presents ankle pain after twisting her ankle.  Patient states she was getting ready for work walking down the stairs and felt lightheaded.  States that she slipped down 3 steps and rolled her right ankle.  She is endorsing some pain on her right ankle, has been ambulatory since the fall.  Denies any numbness or tingling.  Endorses some swelling of her right ankle.  She denies any head injury or loss of consciousness.  She took some naproxen prior to arrival.  States that she has been seeing her PCP for the past 3 to 4 weeks for her lightheaded symptoms but have not had a workup yet.  Patient endorses constipation, last bowel movement was on Friday states that this is very common for her.  Denies any fever, chills, n/v, headache, dizziness, vision changes, neck tenderness or stiffness, weakness, cp, palpitations,  sob, cough, abd pain, dysuria, hematuria, diarrhea,bloody stools.  She denies any drug use, alcohol or smoking.  States she hydrates well.  Patient states she has not had anything to eat yet but has been drinking water this morning.  Nursing Notes were all reviewed and agreed with or any disagreements were addressed in the HPI.    ROS:   Pertinent positives and

## 2025-08-05 ENCOUNTER — TRANSCRIBE ORDERS (OUTPATIENT)
Dept: ADMINISTRATIVE | Age: 40
End: 2025-08-05

## 2025-08-05 DIAGNOSIS — M25.571 ACUTE RIGHT ANKLE PAIN: Primary | ICD-10-CM

## 2025-08-08 ENCOUNTER — TRANSCRIBE ORDERS (OUTPATIENT)
Dept: ADMINISTRATIVE | Age: 40
End: 2025-08-08

## 2025-08-08 DIAGNOSIS — R07.9 CHEST PAIN, UNSPECIFIED TYPE: Primary | ICD-10-CM

## 2025-08-13 ENCOUNTER — HOSPITAL ENCOUNTER (OUTPATIENT)
Dept: LAB | Age: 40
Discharge: HOME | End: 2025-08-13
Payer: COMMERCIAL

## 2025-08-13 LAB
25(OH)D3 SERPL-MCNC: 38.2 NG/ML (ref 30–100)
EST. AVERAGE GLUCOSE BLD GHB EST-MCNC: 97 MG/DL
HBA1C MFR BLD: 5 % (ref 4–6)
T4 FREE SERPL-MCNC: 0.9 NG/DL (ref 0.92–1.68)
TSH SERPL DL<=0.05 MIU/L-ACNC: 6.74 UIU/ML (ref 0.27–4.2)
VIT B12 SERPL-MCNC: 532 PG/ML (ref 232–1245)

## 2025-08-13 PROCEDURE — 84443 ASSAY THYROID STIM HORMONE: CPT

## 2025-08-13 PROCEDURE — 82306 VITAMIN D 25 HYDROXY: CPT

## 2025-08-13 PROCEDURE — 83036 HEMOGLOBIN GLYCOSYLATED A1C: CPT

## 2025-08-13 PROCEDURE — 36415 COLL VENOUS BLD VENIPUNCTURE: CPT

## 2025-08-13 PROCEDURE — 84439 ASSAY OF FREE THYROXINE: CPT

## 2025-08-13 PROCEDURE — 82607 VITAMIN B-12: CPT

## 2025-08-25 ENCOUNTER — HOSPITAL ENCOUNTER (OUTPATIENT)
Age: 40
Discharge: HOME | End: 2025-08-27
Attending: INTERNAL MEDICINE
Payer: COMMERCIAL

## 2025-08-25 VITALS
SYSTOLIC BLOOD PRESSURE: 116 MMHG | WEIGHT: 145 LBS | BODY MASS INDEX: 23.3 KG/M2 | DIASTOLIC BLOOD PRESSURE: 67 MMHG | HEIGHT: 66 IN

## 2025-08-25 DIAGNOSIS — R07.9 CHEST PAIN, UNSPECIFIED TYPE: ICD-10-CM

## 2025-08-25 LAB
ECHO AO SINUS VALSALVA DIAM: 2.6 CM
ECHO AO SINUS VALSALVA INDEX: 1.49 CM/M2
ECHO AO ST JNCT DIAM: 2.1 CM
ECHO AV CUSP MM: 1.8 CM
ECHO AV MEAN GRADIENT: 4 MMHG
ECHO AV MEAN VELOCITY: 0.9 M/S
ECHO AV PEAK GRADIENT: 7 MMHG
ECHO AV PEAK VELOCITY: 1.4 M/S
ECHO AV VELOCITY RATIO: 0.71
ECHO AV VTI: 32.9 CM
ECHO BSA: 1.75 M2
ECHO EST RA PRESSURE: 3 MMHG
ECHO LA AREA 2C: 18.9 CM2
ECHO LA AREA 4C: 15.5 CM2
ECHO LA MAJOR AXIS: 4.6 CM
ECHO LA MINOR AXIS: 5.2 CM
ECHO LA VOL BP: 52 ML (ref 22–52)
ECHO LA VOL MOD A2C: 56 ML (ref 22–52)
ECHO LA VOL MOD A4C: 44 ML (ref 22–52)
ECHO LA VOL/BSA BIPLANE: 30 ML/M2 (ref 16–34)
ECHO LA VOLUME INDEX MOD A2C: 32 ML/M2 (ref 16–34)
ECHO LA VOLUME INDEX MOD A4C: 25 ML/M2 (ref 16–34)
ECHO LV E' LATERAL VELOCITY: 18.8 CM/S
ECHO LV EDV A2C: 86 ML
ECHO LV EDV A4C: 73 ML
ECHO LV EDV INDEX A4C: 42 ML/M2
ECHO LV EDV NDEX A2C: 49 ML/M2
ECHO LV EF PHYSICIAN: 60 %
ECHO LV EJECTION FRACTION A2C: 63 %
ECHO LV EJECTION FRACTION A4C: 59 %
ECHO LV EJECTION FRACTION BIPLANE: 61 % (ref 55–100)
ECHO LV ESV A2C: 31 ML
ECHO LV ESV A4C: 30 ML
ECHO LV ESV INDEX A2C: 18 ML/M2
ECHO LV ESV INDEX A4C: 17 ML/M2
ECHO LV FRACTIONAL SHORTENING: 30 % (ref 28–44)
ECHO LV INTERNAL DIMENSION DIASTOLE INDEX: 2.64 CM/M2
ECHO LV INTERNAL DIMENSION DIASTOLIC: 4.6 CM (ref 3.9–5.3)
ECHO LV INTERNAL DIMENSION SYSTOLIC INDEX: 1.84 CM/M2
ECHO LV INTERNAL DIMENSION SYSTOLIC: 3.2 CM
ECHO LV IVSD: 0.9 CM (ref 0.6–0.9)
ECHO LV MASS 2D: 137.7 G (ref 67–162)
ECHO LV MASS INDEX 2D: 79.1 G/M2 (ref 43–95)
ECHO LV POSTERIOR WALL DIASTOLIC: 0.9 CM (ref 0.6–0.9)
ECHO LV RELATIVE WALL THICKNESS RATIO: 0.39
ECHO LVOT AV VTI INDEX: 0.65
ECHO LVOT MEAN GRADIENT: 2 MMHG
ECHO LVOT PEAK GRADIENT: 4 MMHG
ECHO LVOT PEAK VELOCITY: 1 M/S
ECHO LVOT VTI: 21.4 CM
ECHO MV A VELOCITY: 0.35 M/S
ECHO MV E DECELERATION TIME (DT): 296 MS
ECHO MV E VELOCITY: 0.66 M/S
ECHO MV E/A RATIO: 1.89
ECHO MV E/E' LATERAL: 3.51
ECHO PV MAX VELOCITY: 0.8 M/S
ECHO PV PEAK GRADIENT: 3 MMHG
ECHO RIGHT VENTRICULAR SYSTOLIC PRESSURE (RVSP): 21 MMHG
ECHO RV BASAL DIMENSION: 3.1 CM
ECHO RV TAPSE: 2.5 CM (ref 1.7–?)
ECHO TV REGURGITANT MAX VELOCITY: 2.1 M/S
ECHO TV REGURGITANT PEAK GRADIENT: 18 MMHG

## 2025-08-25 PROCEDURE — 93306 TTE W/DOPPLER COMPLETE: CPT

## 2025-08-25 PROCEDURE — 93306 TTE W/DOPPLER COMPLETE: CPT | Performed by: FAMILY MEDICINE

## (undated) DEVICE — SILVER-COATED ANTIBACTERIAL BARRIER DRESSING: Brand: ACTICOAT SURGIC 10X25CM 5PK US

## (undated) DEVICE — SPONGE COUNTING BAG: Brand: DEVON

## (undated) DEVICE — CHLORAPREP 26ML ORANGE

## (undated) DEVICE — SUTURE VCRL SZ 2-0 L36IN ABSRB UD L36MM CT-1 1/2 CIR J945H

## (undated) DEVICE — ELECTRODE ES AD CRD L15FT DISP FOR PT BELOW 30LB REM

## (undated) DEVICE — GLOVE ORANGE PI 7 1/2   MSG9075

## (undated) DEVICE — TRAY CATH 16FR F INCLUDE LUB DRNGE BG STATLOK STBL DEV

## (undated) DEVICE — LABEL SYS CORR MED

## (undated) DEVICE — COVER LT HNDL BLU PLAS

## (undated) DEVICE — STAPLER EXT SKIN 35 WIDE S STL STPL SQUEEZE HNDL VISISTAT

## (undated) DEVICE — SUTURE VCRL SZ 1 L36IN ABSRB UD CTX L48MM 1/2 CIR J977H

## (undated) DEVICE — AGENT HEMSTAT W4XL8IN OXIDIZED REGENERATED CELOS ABSRB

## (undated) DEVICE — PACK PROCEDURE SURG C SECT CUST STRL

## (undated) DEVICE — SUTURE PDS II SZ 0 L36IN ABSRB VLT L36MM CT-1 1/2 CIR Z346H